# Patient Record
Sex: FEMALE | Race: WHITE
[De-identification: names, ages, dates, MRNs, and addresses within clinical notes are randomized per-mention and may not be internally consistent; named-entity substitution may affect disease eponyms.]

---

## 2017-07-04 ENCOUNTER — HOSPITAL ENCOUNTER (INPATIENT)
Dept: HOSPITAL 10 - PP2 | Age: 62
LOS: 3 days | Discharge: HOME HEALTH | DRG: 71 | End: 2017-07-07
Attending: HOSPITALIST | Admitting: HOSPITALIST
Payer: COMMERCIAL

## 2017-07-04 VITALS
WEIGHT: 159.84 LBS | HEIGHT: 60 IN | BODY MASS INDEX: 31.38 KG/M2 | WEIGHT: 159.84 LBS | HEIGHT: 60 IN | BODY MASS INDEX: 31.38 KG/M2

## 2017-07-04 VITALS — RESPIRATION RATE: 18 BRPM | DIASTOLIC BLOOD PRESSURE: 67 MMHG | SYSTOLIC BLOOD PRESSURE: 122 MMHG

## 2017-07-04 DIAGNOSIS — G93.40: Primary | ICD-10-CM

## 2017-07-04 DIAGNOSIS — F03.90: ICD-10-CM

## 2017-07-04 DIAGNOSIS — T76.11XA: ICD-10-CM

## 2017-07-04 DIAGNOSIS — E11.9: ICD-10-CM

## 2017-07-04 DIAGNOSIS — F32.9: ICD-10-CM

## 2017-07-04 DIAGNOSIS — I10: ICD-10-CM

## 2017-07-04 PROCEDURE — 86592 SYPHILIS TEST NON-TREP QUAL: CPT

## 2017-07-04 PROCEDURE — 80061 LIPID PANEL: CPT

## 2017-07-04 PROCEDURE — 83735 ASSAY OF MAGNESIUM: CPT

## 2017-07-04 PROCEDURE — 85025 COMPLETE CBC W/AUTO DIFF WBC: CPT

## 2017-07-04 PROCEDURE — 82550 ASSAY OF CK (CPK): CPT

## 2017-07-04 PROCEDURE — 84436 ASSAY OF TOTAL THYROXINE: CPT

## 2017-07-04 PROCEDURE — 84100 ASSAY OF PHOSPHORUS: CPT

## 2017-07-04 PROCEDURE — 80048 BASIC METABOLIC PNL TOTAL CA: CPT

## 2017-07-04 PROCEDURE — 84484 ASSAY OF TROPONIN QUANT: CPT

## 2017-07-04 PROCEDURE — 84443 ASSAY THYROID STIM HORMONE: CPT

## 2017-07-04 PROCEDURE — 82962 GLUCOSE BLOOD TEST: CPT

## 2017-07-04 PROCEDURE — 70460 CT HEAD/BRAIN W/DYE: CPT

## 2017-07-04 PROCEDURE — 82607 VITAMIN B-12: CPT

## 2017-07-04 PROCEDURE — 82553 CREATINE MB FRACTION: CPT

## 2017-07-04 PROCEDURE — 87086 URINE CULTURE/COLONY COUNT: CPT

## 2017-07-04 PROCEDURE — 83036 HEMOGLOBIN GLYCOSYLATED A1C: CPT

## 2017-07-04 PROCEDURE — 84479 ASSAY OF THYROID (T3 OR T4): CPT

## 2017-07-05 VITALS — RESPIRATION RATE: 18 BRPM | DIASTOLIC BLOOD PRESSURE: 79 MMHG | SYSTOLIC BLOOD PRESSURE: 168 MMHG

## 2017-07-05 VITALS — RESPIRATION RATE: 19 BRPM | SYSTOLIC BLOOD PRESSURE: 130 MMHG | DIASTOLIC BLOOD PRESSURE: 62 MMHG

## 2017-07-05 VITALS — SYSTOLIC BLOOD PRESSURE: 140 MMHG | DIASTOLIC BLOOD PRESSURE: 64 MMHG

## 2017-07-05 LAB
CHOLEST SERPL-MCNC: 252 MG/DL (ref 100–200)
CHOLEST/HDLC SERPL: 6.3 RATIO
CK MB SERPL-MCNC: 0.55 NG/ML (ref 0–2.4)
CK MB SERPL-MCNC: 0.67 NG/ML (ref 0–2.4)
CK SERPL-CCNC: 50 IU/L (ref 23–200)
CK SERPL-CCNC: 53 IU/L (ref 23–200)
HDLC SERPL-MCNC: 40 MG/DL (ref 35–98)
TRIGL SERPL-MCNC: 180 MG/DL (ref 0–149)
TROPONIN I SERPL-MCNC: < 0.012 NG/ML (ref 0–0.12)
TROPONIN I SERPL-MCNC: < 0.012 NG/ML (ref 0–0.12)
TSH SERPL-ACNC: 0.46 MIU/L (ref 0.47–4.68)

## 2017-07-05 RX ADMIN — FAMOTIDINE SCH MG: 20 TABLET ORAL at 08:30

## 2017-07-05 RX ADMIN — ESCITALOPRAM OXALATE SCH MG: 10 TABLET ORAL at 08:29

## 2017-07-05 RX ADMIN — CEFTRIAXONE SCH MLS/HR: 1 INJECTION, SOLUTION INTRAVENOUS at 01:38

## 2017-07-05 RX ADMIN — AMLODIPINE BESYLATE SCH MG: 10 TABLET ORAL at 08:30

## 2017-07-05 RX ADMIN — FAMOTIDINE SCH MG: 20 TABLET ORAL at 20:37

## 2017-07-05 RX ADMIN — ENOXAPARIN SODIUM SCH MG: 100 INJECTION SUBCUTANEOUS at 08:29
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
## 2017-07-06 VITALS — RESPIRATION RATE: 18 BRPM | DIASTOLIC BLOOD PRESSURE: 84 MMHG | SYSTOLIC BLOOD PRESSURE: 186 MMHG

## 2017-07-06 VITALS — DIASTOLIC BLOOD PRESSURE: 95 MMHG | SYSTOLIC BLOOD PRESSURE: 147 MMHG | RESPIRATION RATE: 18 BRPM

## 2017-07-06 VITALS — DIASTOLIC BLOOD PRESSURE: 80 MMHG | SYSTOLIC BLOOD PRESSURE: 170 MMHG | RESPIRATION RATE: 20 BRPM

## 2017-07-06 VITALS — SYSTOLIC BLOOD PRESSURE: 176 MMHG | DIASTOLIC BLOOD PRESSURE: 85 MMHG

## 2017-07-06 VITALS — SYSTOLIC BLOOD PRESSURE: 196 MMHG | DIASTOLIC BLOOD PRESSURE: 85 MMHG

## 2017-07-06 VITALS — SYSTOLIC BLOOD PRESSURE: 145 MMHG | HEART RATE: 76 BPM | DIASTOLIC BLOOD PRESSURE: 70 MMHG

## 2017-07-06 VITALS — SYSTOLIC BLOOD PRESSURE: 162 MMHG | DIASTOLIC BLOOD PRESSURE: 72 MMHG | RESPIRATION RATE: 18 BRPM

## 2017-07-06 VITALS — DIASTOLIC BLOOD PRESSURE: 65 MMHG | SYSTOLIC BLOOD PRESSURE: 139 MMHG | RESPIRATION RATE: 20 BRPM

## 2017-07-06 VITALS — DIASTOLIC BLOOD PRESSURE: 94 MMHG | SYSTOLIC BLOOD PRESSURE: 152 MMHG

## 2017-07-06 LAB
ADD SCAN DIFF: NO
ANION GAP SERPL CALC-SCNC: 15 MMOL/L (ref 8–16)
BASOPHILS # BLD AUTO: 0.1 10^3/UL (ref 0–0.1)
BASOPHILS NFR BLD: 0.7 % (ref 0–2)
BUN SERPL-MCNC: 9 MG/DL (ref 7–20)
CALCIUM SERPL-MCNC: 9.5 MG/DL (ref 8.4–10.2)
CHLORIDE SERPL-SCNC: 107 MMOL/L (ref 97–110)
CO2 SERPL-SCNC: 25 MMOL/L (ref 21–31)
CREAT SERPL-MCNC: 0.88 MG/DL (ref 0.44–1)
EOSINOPHIL # BLD: 0.2 10^3/UL (ref 0–0.5)
EOSINOPHIL NFR BLD: 2.5 % (ref 0–7)
ERYTHROCYTE [DISTWIDTH] IN BLOOD BY AUTOMATED COUNT: 12.7 % (ref 11.5–14.5)
GLUCOSE SERPL-MCNC: 108 MG/DL (ref 70–220)
HCT VFR BLD CALC: 37.2 % (ref 37–47)
HGB BLD-MCNC: 11.8 G/DL (ref 12–16)
LYMPHOCYTES # BLD AUTO: 2.9 10^3/UL (ref 0.8–2.9)
LYMPHOCYTES NFR BLD AUTO: 38.2 % (ref 15–51)
MAGNESIUM SERPL-MCNC: 1.9 MG/DL (ref 1.7–2.5)
MCH RBC QN AUTO: 26 PG (ref 29–33)
MCHC RBC AUTO-ENTMCNC: 31.7 G/DL (ref 32–37)
MCV RBC AUTO: 81.9 FL (ref 82–101)
MONOCYTES # BLD: 0.7 10^3/UL (ref 0.3–0.9)
MONOCYTES NFR BLD: 8.9 % (ref 0–11)
NEUTROPHILS # BLD: 3.8 10^3/UL (ref 1.6–7.5)
NEUTROPHILS NFR BLD AUTO: 49.4 % (ref 39–77)
NRBC # BLD MANUAL: 0 10^3/UL (ref 0–0)
NRBC BLD QL: 0 /100WBC (ref 0–0)
PHOSPHATE SERPL-MCNC: 4.3 MG/DL (ref 2.5–4.9)
PLATELET # BLD: 250 10^3/UL (ref 140–415)
PMV BLD AUTO: 10.8 FL (ref 7.4–10.4)
POTASSIUM SERPL-SCNC: 4.3 MMOL/L (ref 3.5–5.1)
RBC # BLD AUTO: 4.54 10^6/UL (ref 4.2–5.4)
SODIUM SERPL-SCNC: 143 MMOL/L (ref 135–144)
T3RU NFR SERPL: 36.8 % (ref 23.5–40.5)
WBC # BLD AUTO: 7.7 10^3/UL (ref 4.8–10.8)

## 2017-07-06 RX ADMIN — LORAZEPAM PRN MG: 2 INJECTION, SOLUTION INTRAMUSCULAR; INTRAVENOUS at 14:04

## 2017-07-06 RX ADMIN — LORAZEPAM PRN MG: 2 INJECTION, SOLUTION INTRAMUSCULAR; INTRAVENOUS at 05:12

## 2017-07-06 RX ADMIN — FAMOTIDINE SCH MG: 20 TABLET ORAL at 08:48

## 2017-07-06 RX ADMIN — HYDRALAZINE HYDROCHLORIDE PRN MG: 20 INJECTION INTRAMUSCULAR; INTRAVENOUS at 13:07

## 2017-07-06 RX ADMIN — CEFTRIAXONE SCH MLS/HR: 1 INJECTION, SOLUTION INTRAVENOUS at 02:36

## 2017-07-06 RX ADMIN — AMLODIPINE BESYLATE SCH MG: 10 TABLET ORAL at 08:48

## 2017-07-06 RX ADMIN — ENOXAPARIN SODIUM SCH MG: 100 INJECTION SUBCUTANEOUS at 08:49

## 2017-07-06 RX ADMIN — LORAZEPAM PRN MG: 2 INJECTION, SOLUTION INTRAMUSCULAR; INTRAVENOUS at 09:32

## 2017-07-06 RX ADMIN — LISINOPRIL SCH MG: 10 TABLET ORAL at 21:05

## 2017-07-06 RX ADMIN — FAMOTIDINE SCH MG: 20 TABLET ORAL at 21:04

## 2017-07-06 RX ADMIN — ESCITALOPRAM OXALATE SCH MG: 10 TABLET ORAL at 08:48
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
## 2017-07-07 VITALS — RESPIRATION RATE: 18 BRPM | HEART RATE: 80 BPM | SYSTOLIC BLOOD PRESSURE: 142 MMHG | DIASTOLIC BLOOD PRESSURE: 75 MMHG

## 2017-07-07 VITALS — DIASTOLIC BLOOD PRESSURE: 88 MMHG | SYSTOLIC BLOOD PRESSURE: 140 MMHG | HEART RATE: 66 BPM

## 2017-07-07 VITALS — RESPIRATION RATE: 18 BRPM | DIASTOLIC BLOOD PRESSURE: 79 MMHG | SYSTOLIC BLOOD PRESSURE: 152 MMHG

## 2017-07-07 VITALS — RESPIRATION RATE: 20 BRPM | DIASTOLIC BLOOD PRESSURE: 92 MMHG | SYSTOLIC BLOOD PRESSURE: 175 MMHG

## 2017-07-07 VITALS — DIASTOLIC BLOOD PRESSURE: 63 MMHG | SYSTOLIC BLOOD PRESSURE: 116 MMHG | RESPIRATION RATE: 18 BRPM

## 2017-07-07 LAB
ADD SCAN DIFF: NO
ANION GAP SERPL CALC-SCNC: 20 MMOL/L (ref 8–16)
BASOPHILS # BLD AUTO: 0 10^3/UL (ref 0–0.1)
BASOPHILS NFR BLD: 0.5 % (ref 0–2)
BUN SERPL-MCNC: 10 MG/DL (ref 7–20)
CALCIUM SERPL-MCNC: 9.7 MG/DL (ref 8.4–10.2)
CHLORIDE SERPL-SCNC: 104 MMOL/L (ref 97–110)
CO2 SERPL-SCNC: 23 MMOL/L (ref 21–31)
CREAT SERPL-MCNC: 0.85 MG/DL (ref 0.44–1)
EOSINOPHIL # BLD: 0.2 10^3/UL (ref 0–0.5)
EOSINOPHIL NFR BLD: 2.2 % (ref 0–7)
ERYTHROCYTE [DISTWIDTH] IN BLOOD BY AUTOMATED COUNT: 13 % (ref 11.5–14.5)
GLUCOSE SERPL-MCNC: 110 MG/DL (ref 70–220)
HCT VFR BLD CALC: 37.9 % (ref 37–47)
HGB BLD-MCNC: 12.5 G/DL (ref 12–16)
LYMPHOCYTES # BLD AUTO: 1.9 10^3/UL (ref 0.8–2.9)
LYMPHOCYTES NFR BLD AUTO: 22.5 % (ref 15–51)
MCH RBC QN AUTO: 26.5 PG (ref 29–33)
MCHC RBC AUTO-ENTMCNC: 33 G/DL (ref 32–37)
MCV RBC AUTO: 80.5 FL (ref 82–101)
MONOCYTES # BLD: 0.7 10^3/UL (ref 0.3–0.9)
MONOCYTES NFR BLD: 8.1 % (ref 0–11)
NEUTROPHILS # BLD: 5.7 10^3/UL (ref 1.6–7.5)
NEUTROPHILS NFR BLD AUTO: 66.5 % (ref 39–77)
NRBC # BLD MANUAL: 0 10^3/UL (ref 0–0)
NRBC BLD QL: 0 /100WBC (ref 0–0)
PLATELET # BLD: 265 10^3/UL (ref 140–415)
PMV BLD AUTO: 10.8 FL (ref 7.4–10.4)
POTASSIUM SERPL-SCNC: 4 MMOL/L (ref 3.5–5.1)
RBC # BLD AUTO: 4.71 10^6/UL (ref 4.2–5.4)
SODIUM SERPL-SCNC: 143 MMOL/L (ref 135–144)
WBC # BLD AUTO: 8.5 10^3/UL (ref 4.8–10.8)

## 2017-07-07 RX ADMIN — ESCITALOPRAM OXALATE SCH MG: 10 TABLET ORAL at 09:30

## 2017-07-07 RX ADMIN — CEFTRIAXONE SCH MLS/HR: 1 INJECTION, SOLUTION INTRAVENOUS at 02:08

## 2017-07-07 RX ADMIN — LISINOPRIL SCH MG: 10 TABLET ORAL at 09:30

## 2017-07-07 RX ADMIN — HYDRALAZINE HYDROCHLORIDE PRN MG: 20 INJECTION INTRAMUSCULAR; INTRAVENOUS at 15:27

## 2017-07-07 RX ADMIN — AMLODIPINE BESYLATE SCH MG: 10 TABLET ORAL at 09:31

## 2017-07-07 RX ADMIN — ENOXAPARIN SODIUM SCH MG: 100 INJECTION SUBCUTANEOUS at 09:32

## 2017-07-07 RX ADMIN — FAMOTIDINE SCH MG: 20 TABLET ORAL at 09:30
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
## 2017-07-07 NOTE — RADRPT
PROCEDURE:   CT brain with contrast 

 

CLINICAL INDICATION:   Altered mental status, neurologic deficit

 

TECHNIQUE:   CT scan of the Brain with contrast was performed with a 64-slice multi detector scanner
.  Contiguous 5 mm slice thickness transaxial images were acquired from the high vertex to the florentino
en magnum.  The images were reviewed on a PACS workstation. The patient was examined following the u
ncomplicated intravenous administration of 75cc of Isovue 300. DOSE: CTDI = 53 mGy and the DLP = 870
 mGy-cm. One or more of the following dose reduction techniques were used: Automated exposure contro
l, Adjustment of the mA and/or kV according to patient size, and/or use of iterative reconstruction 
technique.

 

COMPARISON:   No prior studies are available for comparison. 

 

FINDINGS:

 

No acute parenchymal or subdural hemorrhage, significant mass effect or midline shift. Patchy hypoat
tenuation of the cerebral white matter is compatible with chronic microvascular ischemic changes. Va
scular calcifications. Prominence of the cortical sulci and ventricles are related to moderate-sever
e cerebral volume loss.  No significant opacification of the visualized paranasal sinuses or mastoid
s. 

 

Heavy atherosclerotic calcification identified at the vertebral arteries with moderate to high-grade
 stenosis at the right proximal vertebral artery.  The right vertebral artery is hypoplastic when co
mpared to the left.  No proximal large vessel occlusion of the left vertebral artery, basilar artery
 or bilateral PCA. Multifocal luminal narrowing of the bilateral cavernous and clinoid ICA.  No prox
imal large vessel occlusion of the bilateral MCA or TABATHA.

 

IMPRESSION:

 

No definite acute intracranial findings.

Chronic microvascular disease and intracranial atherosclerosis.

Moderate-severe cerebral volume loss.

No anterior circulation proximal large vessel occlusion identified.

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

 

RPTAT: AA

_____________________________________________ 

.Eliezer De La Torre MD MD           Date    Time 

Electronically viewed and signed by .Eliezre De La Torre MD, MD on 07/07/2017 09:14 

 

D:  07/07/2017 09:14  T:  07/07/2017 09:14

.T/

## 2017-07-07 NOTE — PDOCDIS
Discharge Instructions


CONDITION


Patient Condition:  Good





HOME CARE INSTRUCTIONS:


Special Diet:  1800 TOBY, PUREED





ACTIVITY:








Activity Restrictions:   No Restrictions











FOLLOW UP/APPOINTMENTS


Follow-up Plan


F/U WITH YOUR PCP IN 1-2 WEEKS











KARI ALBARADO Jul 7, 2017 11:45

## 2017-11-26 ENCOUNTER — HOSPITAL ENCOUNTER (EMERGENCY)
Dept: HOSPITAL 10 - E/R | Age: 62
Discharge: HOME | End: 2017-11-26
Payer: COMMERCIAL

## 2017-11-26 VITALS
HEIGHT: 64 IN | HEIGHT: 64 IN | BODY MASS INDEX: 24.81 KG/M2 | WEIGHT: 145.31 LBS | WEIGHT: 145.31 LBS | BODY MASS INDEX: 24.81 KG/M2

## 2017-11-26 VITALS
SYSTOLIC BLOOD PRESSURE: 182 MMHG | DIASTOLIC BLOOD PRESSURE: 86 MMHG | TEMPERATURE: 98.8 F | HEART RATE: 95 BPM | RESPIRATION RATE: 16 BRPM

## 2017-11-26 DIAGNOSIS — Z79.82: ICD-10-CM

## 2017-11-26 DIAGNOSIS — I25.10: ICD-10-CM

## 2017-11-26 DIAGNOSIS — F17.210: ICD-10-CM

## 2017-11-26 DIAGNOSIS — I10: ICD-10-CM

## 2017-11-26 DIAGNOSIS — E11.9: ICD-10-CM

## 2017-11-26 DIAGNOSIS — Z79.84: ICD-10-CM

## 2017-11-26 DIAGNOSIS — G30.8: ICD-10-CM

## 2017-11-26 DIAGNOSIS — N39.0: Primary | ICD-10-CM

## 2017-11-26 LAB
ADD UMIC: YES
ALBUMIN SERPL-MCNC: 3.7 G/DL (ref 3.3–4.9)
ALBUMIN/GLOB SERPL: 0.88 {RATIO}
ALP SERPL-CCNC: 145 IU/L (ref 42–121)
ALT SERPL-CCNC: 25 IU/L (ref 13–69)
ANION GAP SERPL CALC-SCNC: 19 MMOL/L (ref 8–16)
AST SERPL-CCNC: 23 IU/L (ref 15–46)
BACTERIA #/AREA URNS HPF: (no result) /HPF
BASOPHILS # BLD AUTO: 0.1 10^3/UL (ref 0–0.1)
BASOPHILS NFR BLD: 0.4 % (ref 0–2)
BILIRUB DIRECT SERPL-MCNC: 0 MG/DL (ref 0–0.2)
BILIRUB SERPL-MCNC: 0.5 MG/DL (ref 0.2–1.3)
BUN SERPL-MCNC: 23 MG/DL (ref 7–20)
CALCIUM SERPL-MCNC: 9.1 MG/DL (ref 8.4–10.2)
CHLORIDE SERPL-SCNC: 99 MMOL/L (ref 97–110)
CO2 SERPL-SCNC: 26 MMOL/L (ref 21–31)
COLOR UR: (no result)
CREAT SERPL-MCNC: 0.94 MG/DL (ref 0.44–1)
EOSINOPHIL # BLD: 0.2 10^3/UL (ref 0–0.5)
EOSINOPHIL NFR BLD: 2.1 % (ref 0–7)
ERYTHROCYTE [DISTWIDTH] IN BLOOD BY AUTOMATED COUNT: 13.6 % (ref 11.5–14.5)
GLOBULIN SER-MCNC: 4.2 G/DL (ref 1.3–3.2)
GLUCOSE SERPL-MCNC: 154 MG/DL (ref 70–220)
GLUCOSE UR STRIP-MCNC: NEGATIVE MG/DL
HCT VFR BLD CALC: 34.5 % (ref 37–47)
HGB BLD-MCNC: 10.8 G/DL (ref 12–16)
KETONES UR STRIP.AUTO-MCNC: NEGATIVE MG/DL
LYMPHOCYTES # BLD AUTO: 2.7 10^3/UL (ref 0.8–2.9)
LYMPHOCYTES NFR BLD AUTO: 23.2 % (ref 15–51)
MCH RBC QN AUTO: 24.3 PG (ref 29–33)
MCHC RBC AUTO-ENTMCNC: 31.3 G/DL (ref 32–37)
MCV RBC AUTO: 77.7 FL (ref 82–101)
MONOCYTES # BLD: 0.7 10^3/UL (ref 0.3–0.9)
MONOCYTES NFR BLD: 5.7 % (ref 0–11)
MUCOUS THREADS #/AREA URNS HPF: (no result) /HPF
NEUTROPHILS # BLD: 7.8 10^3/UL (ref 1.6–7.5)
NEUTROPHILS NFR BLD AUTO: 68.2 % (ref 39–77)
NITRITE UR QL STRIP.AUTO: NEGATIVE MG/DL
NRBC # BLD MANUAL: 0 10^3/UL (ref 0–0)
NRBC BLD AUTO-RTO: 0 /100WBC (ref 0–0)
PLATELET # BLD: 340 10^3/UL (ref 140–415)
PMV BLD AUTO: 11.2 FL (ref 7.4–10.4)
POTASSIUM SERPL-SCNC: 3.5 MMOL/L (ref 3.5–5.1)
PROT SERPL-MCNC: 7.9 G/DL (ref 6.1–8.1)
RBC # BLD AUTO: 4.44 10^6/UL (ref 4.2–5.4)
RBC # UR AUTO: (no result) MG/DL
SODIUM SERPL-SCNC: 140 MMOL/L (ref 135–144)
SQUAMOUS #/AREA URNS HPF: (no result) /HPF
TROPONIN I SERPL-MCNC: < 0.012 NG/ML (ref 0–0.12)
UR ASCORBIC ACID: NEGATIVE MG/DL
UR BILIRUBIN (DIP): NEGATIVE MG/DL
UR CLARITY: (no result)
UR PH (DIP): 5 (ref 5–9)
UR RBC: 45 /HPF (ref 0–5)
UR SPECIFIC GRAVITY (DIP): 1.02 (ref 1–1.03)
UR TOTAL PROTEIN (DIP): (no result) MG/DL
UROBILINOGEN UR STRIP-ACNC: (no result) MG/DL
WBC # BLD AUTO: 11.4 10^3/UL (ref 4.8–10.8)
WBC # UR STRIP: (no result) LEU/UL

## 2017-11-26 PROCEDURE — 84484 ASSAY OF TROPONIN QUANT: CPT

## 2017-11-26 PROCEDURE — 96374 THER/PROPH/DIAG INJ IV PUSH: CPT

## 2017-11-26 PROCEDURE — 87086 URINE CULTURE/COLONY COUNT: CPT

## 2017-11-26 PROCEDURE — 80053 COMPREHEN METABOLIC PANEL: CPT

## 2017-11-26 PROCEDURE — 81001 URINALYSIS AUTO W/SCOPE: CPT

## 2017-11-26 PROCEDURE — 96375 TX/PRO/DX INJ NEW DRUG ADDON: CPT

## 2017-11-26 PROCEDURE — 36415 COLL VENOUS BLD VENIPUNCTURE: CPT

## 2017-11-26 PROCEDURE — P9612 CATHETERIZE FOR URINE SPEC: HCPCS

## 2017-11-26 PROCEDURE — 85025 COMPLETE CBC W/AUTO DIFF WBC: CPT

## 2017-11-26 NOTE — ERD
ER Documentation


Chief Complaint


Chief Complaint


generalize pain & weakness, poor appetite x2 days per sons





HPI


Patient is a 62-year-old female with severe dementia who presents with 

increased agitation and moaning for the last 2 days.  The patient is nonverbal 

at baseline.  She is Romansh speaking only.  Her son thinks that she may be 

experiencing pain.  He denies any trauma or injury.  He denies fever or 

vomiting.  She has not been constipated.  History is limited due to the patient 

being nonverbal.  He states that she is not been able to take her medication 

and has had poor appetite for the last 2 days as well.





ROS


All systems reviewed and are negative except as per history of present illness.





Medications


Home Meds


Active Scripts


Cephalexin* (Cephalexin* Susp) 250 Mg/5 Ml Susp.recon, 10 ML PO Q6 for 7 Days, #

1 BOTTLE


   Prov:RIKI LONGO MD         11/26/17


Atorvastatin Calcium* (Atorvastatin Calcium*) 20 Mg Tablet, 20 MG PO QHS, #60 

TAB


   Prov:KARI ALBARADO         7/7/17


Aspirin* (Aspirin* EC) 81 Mg Tablet.dr, 81 MG PO DAILY, #90 TAB


   Prov:KARI ALBARADO         7/7/17


Lisinopril* (Lisinopril*) 10 Mg Tablet, 10 MG PO DAILY, #60 TAB


   Prov:KARI ALBARADO         7/7/17


Reported Medications


Amlodipine Besylate* (Amlodipine Besylate*) 10 Mg Tablet, 10 MG PO DAILY, #30 

TAB


   7/4/17


Clonidine Hcl* (Clonidine Hcl*) 0.2 Mg Tablet, 0.2 MG PO DAILY, TAB


   7/4/17


Escitalopram Oxalate* (Escitalopram Oxalate*) 10 Mg Tablet, 10 MG PO DAILY, #30 

TAB


   7/4/17


Metoprolol Succinate* (Toprol XL*) 100 Mg Tab.sr.24h, 100 MG PO DAILY, #30 TAB


   7/4/17


Metformin Hcl* (Metformin Hcl*) 1,000 Mg Tablet, 1000 MG PO WITH BREAKFAST DINNE

, #30 TAB


   7/4/17


Meclizine Hcl* (Meclizine Hcl*) 25 Mg Tablet, 25 MG PO TID, TAB


   7/4/17





Allergies


Allergies:  


Coded Allergies:  


     ondansetron (Verified  Allergy, Mild, Itching and redness on face, 7/4/17)





PMhx/Soc


Past medical history: CVA with left sided hemiparesis, coronary artery disease, 

severe dementia, hypertension, diabetes mellitus


Past surgical history: Back surgery


Social history: Lives with children, no tobacco or alcohol


History of Surgery:  Yes (per pt back sx)


Hx Neurological Disorder:  No (stroke 2016-per ED nurse )


Hx Respiratory Disorders:  No


Hx Cardiac Disorders:  Yes (HTN)


Hx Miscellaneous Medical Probl:  Yes (Chronic depression. Dementia.)


Hx Alcohol Use:  No


Hx Substance Use:  No


Hx Tobacco Use:  Yes


Smoking Status:  Current every day smoker





FmHx


Noncontributory





Physical Exam


Vitals





Vital Signs








  Date Time  Temp Pulse Resp B/P Pulse Ox O2 Delivery O2 Flow Rate FiO2


 


11/26/17 17:59 98.8 95 16 182/86 97 Room Air  


 


11/26/17 14:49 99.1 129 18 184/97 97   








Physical Exam


Const:     Alert, nonverbal, appears calm with intermittent loud screaming.


Head:   Atraumatic 


Eyes:    Normal Conjunctiva, No pallor, no icterus


ENT:    Normal External Ears, Nose and Mouth.  Tacky mucous membranes


Neck:               Full range of motion.


Respiratory:    Lungs clear to auscultation, no wheezes, no rales


Cardio:    Regular rate and rhythm, no murmurs


Abd:    Soft, non tender, non distended.


Skin:    No petechiae, Follicular rash without signs of cellulitis on left 

chest wall and axilla, no vesicular or dermatomal rash


Back:    No midline or flank tenderness


Ext:    No cyanosis, or edema


Neur:    Awake and alert, Flexion contracture of left arm


Psych:    Agitated, assessment limited by severe dementia


Result Diagram:  


11/26/17 1559                                                                  

              11/26/17 1559





Results 24 hrs





 Laboratory Tests








Test


  11/26/17


15:59 11/26/17


16:00


 


White Blood Count 11.410^3/ul  


 


Red Blood Count 4.4410^6/ul  


 


Hemoglobin 10.8g/dl  


 


Hematocrit 34.5%  


 


Mean Corpuscular Volume 77.7fl  


 


Mean Corpuscular Hemoglobin 24.3pg  


 


Mean Corpuscular Hemoglobin


Concent 31.3g/dl 


  


 


 


Red Cell Distribution Width 13.6%  


 


Platelet Count 10134^3/UL  


 


Mean Platelet Volume 11.2fl  


 


Neutrophils % 68.2%  


 


Lymphocytes % 23.2%  


 


Monocytes % 5.7%  


 


Eosinophils % 2.1%  


 


Basophils % 0.4%  


 


Nucleated Red Blood Cells % 0.0/100WBC  


 


Neutrophils # 7.810^3/ul  


 


Lymphocytes # 2.710^3/ul  


 


Monocytes # 0.710^3/ul  


 


Eosinophils # 0.210^3/ul  


 


Basophils # 0.110^3/ul  


 


Nucleated Red Blood Cells # 0.010^3/ul  


 


Sodium Level 140mmol/L  


 


Potassium Level 3.5mmol/L  


 


Chloride Level 99mmol/L  


 


Carbon Dioxide Level 26mmol/L  


 


Anion Gap 19  


 


Blood Urea Nitrogen 23mg/dl  


 


Creatinine 0.94mg/dl  


 


Glucose Level 154mg/dl  


 


Calcium Level 9.1mg/dl  


 


Total Bilirubin 0.5mg/dl  


 


Direct Bilirubin 0.00mg/dl  


 


Indirect Bilirubin 0.5mg/dl  


 


Aspartate Amino Transf


(AST/SGOT) 23IU/L 


  


 


 


Alanine Aminotransferase


(ALT/SGPT) 25IU/L 


  


 


 


Alkaline Phosphatase 145IU/L  


 


Troponin I < 0.012ng/ml  


 


Total Protein 7.9g/dl  


 


Albumin 3.7g/dl  


 


Globulin 4.20g/dl  


 


Albumin/Globulin Ratio 0.88  


 


Urine Color  MIGUEL 


 


Urine Clarity  TURBID 


 


Urine pH  5.0 


 


Urine Specific Gravity  1.024 


 


Urine Ketones  NEGATIVEmg/dL 


 


Urine Nitrite  NEGATIVEmg/dL 


 


Urine Bilirubin  NEGATIVEmg/dL 


 


Urine Urobilinogen  2+mg/dL 


 


Urine Leukocyte Esterase  2+Dipesh/ul 


 


Urine Microscopic RBC  45/HPF 


 


Urine Microscopic WBC  50/HPF 


 


Urine Squamous Epithelial


Cells 


  FEW/HPF 


 


 


Urine Bacteria  MANY/HPF 


 


Urine Mucus  MANY/HPF 


 


Urine Hemoglobin  2+mg/dL 


 


Urine Glucose  NEGATIVEmg/dL 


 


Urine Total Protein  1+mg/dl 








 Current Medications








 Medications


  (Trade)  Dose


 Ordered  Sig/Glen


 Route


 PRN Reason  Start Time


 Stop Time Status Last Admin


Dose Admin


 


 Haloperidol 5 mg  5 mg  STK-MED ONCE


 .ROUTE


   11/26/17 15:44


 11/26/17 15:45 DC  


 


 


 Sodium Chloride


  (NS)  500 ml @ 


 500 mls/hr  Q1H STAT


 IV


   11/26/17 15:53


 11/26/17 16:52 DC 11/26/17 16:24


 


 


 Haloperidol 2.5 mg  2.5 mg  ONCE  ONCE


 IV


   11/26/17 16:00


 11/26/17 16:01 DC 11/26/17 16:24


 


 


 Ceftriaxone Sodium


  (Rocephin)  50 ml @ 


 100 mls/hr  ONCE  ONCE


 IVPB


   11/26/17 17:00


 11/26/17 17:29 DC 11/26/17 17:12


 











Procedures/MDM


MDM: Patient is a 62-year-old female with history of prior stroke and Alzheimer'

s dementia.  She presents to the ER with increased agitation and moaning over 

the last 2 days.  She is also had poor appetite and oral intake.  Her workup 

reveals evidence of urinary tract infection.  She does not have signs of 

sepsis.  She does have signs of mild dehydration.  Urine culture sent, and the 

patient was given IV fluids and a dose of ceftriaxone.  Her son states that she 

is able to tolerate liquid medications, but has had trouble taking her pill 

medications.  She has not seen a primary doctor in over a year.  Her son states 

that she has a primary doctor.  I advised her sons to follow-up with her PMD 

within the next 2 days in case she does not respond adequately to antibiotics, 

and to discuss alternative formulations for her medications.  She may also 

require additional medications for behavioral control for her Alzheimer's 

disease.  She has severe dementia at baseline.  There is no evidence of an 

acute neurological condition or sepsis.





Departure


Diagnosis:  


 Primary Impression:  


 Urinary tract infection


 Urinary tract infection type:  site unspecified  Hematuria presence:  with 

hematuria  Qualified Code:  N39.0 - Urinary tract infection with hematuria, 

site unspecified


 Additional Impressions:  


 Agitation


 Alzheimer's dementia with behavioral disturbance


 Alzheimer's disease onset:  unspecified onset  Qualified Code:  G30.8 - 

Alzheimer's dementia with behavioral disturbance, unspecified timing of 

dementia onset


Condition:  RIKI Garcia MD Nov 26, 2017 16:04

## 2018-08-17 ENCOUNTER — HOSPITAL ENCOUNTER (INPATIENT)
Dept: HOSPITAL 91 - PP2 | Age: 63
LOS: 13 days | Discharge: NURSING, REHAB OR LONG-TERM CARE | DRG: 871 | End: 2018-08-30
Payer: COMMERCIAL

## 2018-08-17 ENCOUNTER — HOSPITAL ENCOUNTER (INPATIENT)
Age: 63
LOS: 13 days | Discharge: NURSING, REHAB OR LONG-TERM CARE | DRG: 871 | End: 2018-08-30

## 2018-08-17 DIAGNOSIS — Z93.1: ICD-10-CM

## 2018-08-17 DIAGNOSIS — M86.8X7: ICD-10-CM

## 2018-08-17 DIAGNOSIS — L89.154: ICD-10-CM

## 2018-08-17 DIAGNOSIS — Z74.01: ICD-10-CM

## 2018-08-17 DIAGNOSIS — Z95.1: ICD-10-CM

## 2018-08-17 DIAGNOSIS — I96: ICD-10-CM

## 2018-08-17 DIAGNOSIS — I69.369: ICD-10-CM

## 2018-08-17 DIAGNOSIS — I70.201: ICD-10-CM

## 2018-08-17 DIAGNOSIS — L89.613: ICD-10-CM

## 2018-08-17 DIAGNOSIS — N39.0: ICD-10-CM

## 2018-08-17 DIAGNOSIS — I69.398: ICD-10-CM

## 2018-08-17 DIAGNOSIS — I10: ICD-10-CM

## 2018-08-17 DIAGNOSIS — F03.90: ICD-10-CM

## 2018-08-17 DIAGNOSIS — B96.5: ICD-10-CM

## 2018-08-17 DIAGNOSIS — B95.2: ICD-10-CM

## 2018-08-17 DIAGNOSIS — B96.4: ICD-10-CM

## 2018-08-17 DIAGNOSIS — M85.80: ICD-10-CM

## 2018-08-17 DIAGNOSIS — Z66: ICD-10-CM

## 2018-08-17 DIAGNOSIS — B95.61: ICD-10-CM

## 2018-08-17 DIAGNOSIS — E11.621: ICD-10-CM

## 2018-08-17 DIAGNOSIS — M24.50: ICD-10-CM

## 2018-08-17 DIAGNOSIS — Z51.5: ICD-10-CM

## 2018-08-17 DIAGNOSIS — L89.892: ICD-10-CM

## 2018-08-17 DIAGNOSIS — G82.50: ICD-10-CM

## 2018-08-17 DIAGNOSIS — A41.9: Primary | ICD-10-CM

## 2018-08-17 DIAGNOSIS — E87.6: ICD-10-CM

## 2018-08-17 PROCEDURE — 81001 URINALYSIS AUTO W/SCOPE: CPT

## 2018-08-17 PROCEDURE — 74177 CT ABD & PELVIS W/CONTRAST: CPT

## 2018-08-17 PROCEDURE — 93005 ELECTROCARDIOGRAM TRACING: CPT

## 2018-08-17 PROCEDURE — 87070 CULTURE OTHR SPECIMN AEROBIC: CPT

## 2018-08-17 PROCEDURE — 71045 X-RAY EXAM CHEST 1 VIEW: CPT

## 2018-08-17 PROCEDURE — 85651 RBC SED RATE NONAUTOMATED: CPT

## 2018-08-17 PROCEDURE — 87040 BLOOD CULTURE FOR BACTERIA: CPT

## 2018-08-17 PROCEDURE — 80053 COMPREHEN METABOLIC PANEL: CPT

## 2018-08-17 PROCEDURE — 82962 GLUCOSE BLOOD TEST: CPT

## 2018-08-17 PROCEDURE — 84520 ASSAY OF UREA NITROGEN: CPT

## 2018-08-17 PROCEDURE — 87081 CULTURE SCREEN ONLY: CPT

## 2018-08-17 PROCEDURE — 80202 ASSAY OF VANCOMYCIN: CPT

## 2018-08-17 PROCEDURE — 87086 URINE CULTURE/COLONY COUNT: CPT

## 2018-08-17 PROCEDURE — 83605 ASSAY OF LACTIC ACID: CPT

## 2018-08-17 PROCEDURE — 80048 BASIC METABOLIC PNL TOTAL CA: CPT

## 2018-08-17 PROCEDURE — 85025 COMPLETE CBC W/AUTO DIFF WBC: CPT

## 2018-08-17 PROCEDURE — 82565 ASSAY OF CREATININE: CPT

## 2018-08-17 PROCEDURE — 83036 HEMOGLOBIN GLYCOSYLATED A1C: CPT

## 2018-08-17 RX ADMIN — ASCORBIC ACID, VITAMIN A PALMITATE, CHOLECALCIFEROL, THIAMINE HYDROCHLORIDE, RIBOFLAVIN-5 PHOSPHATE SODIUM, PYRIDOXINE HYDROCHLORIDE, NIACINAMIDE, DEXPANTHENOL, ALPHA-TOCOPHEROL ACETATE, VITAMIN K1, FOLIC ACID, BIOTIN, CYANOCOBALAMIN 1 MLS/HR: 200; 3300; 200; 6; 3.6; 6; 40; 15; 10; 150; 600; 60; 5 INJECTION, SOLUTION INTRAVENOUS at 23:58

## 2018-08-18 LAB
ADD MAN DIFF?: NO
ADD UMIC: YES
ALANINE AMINOTRANSFERASE: 15 IU/L (ref 13–69)
ALBUMIN/GLOBULIN RATIO: 0.7
ALBUMIN: 3.1 G/DL (ref 3.3–4.9)
ALKALINE PHOSPHATASE: 158 IU/L (ref 42–121)
ANION GAP: 13 (ref 8–16)
ASPARTATE AMINO TRANSFERASE: 23 IU/L (ref 15–46)
BASOPHIL #: 0 10^3/UL (ref 0–0.1)
BASOPHILS %: 0.3 % (ref 0–2)
BILIRUBIN,DIRECT: 0 MG/DL (ref 0–0.2)
BILIRUBIN,TOTAL: 0.3 MG/DL (ref 0.2–1.3)
BLOOD UREA NITROGEN: 15 MG/DL (ref 7–20)
CALCIUM: 8.6 MG/DL (ref 8.4–10.2)
CARBON DIOXIDE: 25 MMOL/L (ref 21–31)
CHLORIDE: 106 MMOL/L (ref 97–110)
CREATININE: 0.55 MG/DL (ref 0.44–1)
EOSINOPHILS #: 0.1 10^3/UL (ref 0–0.5)
EOSINOPHILS %: 0.9 % (ref 0–7)
GLOBULIN: 4.4 G/DL (ref 1.3–3.2)
GLUCOSE: 128 MG/DL (ref 70–220)
HEMATOCRIT: 32.4 % (ref 37–47)
HEMOGLOBIN A1C: 5.9 % (ref 0–5.9)
HEMOGLOBIN: 10.2 G/DL (ref 12–16)
IMMATURE GRANS #M: 0.06 10^3/UL (ref 0–0.03)
IMMATURE GRANS % (M): 0.6 % (ref 0–0.43)
LYMPHOCYTES #: 2 10^3/UL (ref 0.8–2.9)
LYMPHOCYTES %: 20.4 % (ref 15–51)
MEAN CORPUSCULAR HEMOGLOBIN: 27.6 PG (ref 29–33)
MEAN CORPUSCULAR HGB CONC: 31.5 G/DL (ref 32–37)
MEAN CORPUSCULAR VOLUME: 87.6 FL (ref 82–101)
MEAN PLATELET VOLUME: 13.2 FL (ref 7.4–10.4)
MONOCYTE #: 0.8 10^3/UL (ref 0.3–0.9)
MONOCYTES %: 8.4 % (ref 0–11)
NEUTROPHIL #: 6.9 10^3/UL (ref 1.6–7.5)
NEUTROPHILS %: 69.4 % (ref 39–77)
NUCLEATED RED BLOOD CELLS #: 0 10^3/UL (ref 0–0)
NUCLEATED RED BLOOD CELLS%: 0 /100WBC (ref 0–0)
PLATELET COUNT: 270 10^3/UL (ref 140–415)
POSITIVE DIFF: (no result)
POTASSIUM: 3.5 MMOL/L (ref 3.5–5.1)
RED BLOOD COUNT: 3.7 10^6/UL (ref 4.2–5.4)
RED CELL DISTRIBUTION WIDTH: 13.2 % (ref 11.5–14.5)
SODIUM: 140 MMOL/L (ref 135–144)
TOTAL PROTEIN: 7.5 G/DL (ref 6.1–8.1)
UR ASCORBIC ACID: 40 MG/DL
UR BILIRUBIN (DIP): NEGATIVE MG/DL
UR BLOOD (DIP): (no result) MG/DL
UR CLARITY: (no result)
UR COLOR: YELLOW
UR GLUCOSE (DIP): (no result) MG/DL
UR KETONES (DIP): (no result) MG/DL
UR LEUKOCYTE ESTERASE (DIP): NEGATIVE LEU/UL
UR MUCUS: (no result) /HPF
UR NITRITE (DIP): NEGATIVE MG/DL
UR NONSQUAMOUS EPITHELIAL CELL: 1 /HPF
UR PH (DIP): 5 (ref 5–9)
UR RBC: > 182 /HPF (ref 0–5)
UR SPECIFIC GRAVITY (DIP): 1.02 (ref 1–1.03)
UR TOTAL PROTEIN (DIP): (no result) MG/DL
UR UROBILINOGEN (DIP): NEGATIVE MG/DL
UR WBC: 31 /HPF (ref 0–5)
WHITE BLOOD COUNT: 9.9 10^3/UL (ref 4.8–10.8)

## 2018-08-18 RX ADMIN — HEPARIN SODIUM 1 UNIT: 5000 INJECTION, SOLUTION INTRAVENOUS; SUBCUTANEOUS at 00:41

## 2018-08-18 RX ADMIN — INSULIN ASPART 1 UNIT: 100 INJECTION, SOLUTION INTRAVENOUS; SUBCUTANEOUS at 12:56

## 2018-08-18 RX ADMIN — PIPERACILLIN SODIUM AND TAZOBACTAM SODIUM 1 MLS/HR: 3; .375 INJECTION, POWDER, LYOPHILIZED, FOR SOLUTION INTRAVENOUS at 18:16

## 2018-08-18 RX ADMIN — PIPERACILLIN SODIUM AND TAZOBACTAM SODIUM 1 MLS/HR: 3; .375 INJECTION, POWDER, LYOPHILIZED, FOR SOLUTION INTRAVENOUS at 12:55

## 2018-08-18 RX ADMIN — ASCORBIC ACID, VITAMIN A PALMITATE, CHOLECALCIFEROL, THIAMINE HYDROCHLORIDE, RIBOFLAVIN-5 PHOSPHATE SODIUM, PYRIDOXINE HYDROCHLORIDE, NIACINAMIDE, DEXPANTHENOL, ALPHA-TOCOPHEROL ACETATE, VITAMIN K1, FOLIC ACID, BIOTIN, CYANOCOBALAMIN 1 MLS/HR: 200; 3300; 200; 6; 3.6; 6; 40; 15; 10; 150; 600; 60; 5 INJECTION, SOLUTION INTRAVENOUS at 21:53

## 2018-08-18 RX ADMIN — VANCOMYCIN HYDROCHLORIDE 1 MLS/HR: 1 INJECTION, POWDER, LYOPHILIZED, FOR SOLUTION INTRAVENOUS at 02:21

## 2018-08-18 RX ADMIN — HYDRALAZINE HYDROCHLORIDE 1 MG: 20 INJECTION INTRAMUSCULAR; INTRAVENOUS at 02:38

## 2018-08-18 RX ADMIN — COLLAGENASE SANTYL 1 APPLIC: 250 OINTMENT TOPICAL at 22:38

## 2018-08-18 RX ADMIN — ASCORBIC ACID, VITAMIN A PALMITATE, CHOLECALCIFEROL, THIAMINE HYDROCHLORIDE, RIBOFLAVIN-5 PHOSPHATE SODIUM, PYRIDOXINE HYDROCHLORIDE, NIACINAMIDE, DEXPANTHENOL, ALPHA-TOCOPHEROL ACETATE, VITAMIN K1, FOLIC ACID, BIOTIN, CYANOCOBALAMIN 1 MLS/HR: 200; 3300; 200; 6; 3.6; 6; 40; 15; 10; 150; 600; 60; 5 INJECTION, SOLUTION INTRAVENOUS at 12:50

## 2018-08-18 RX ADMIN — INSULIN ASPART 1 UNIT: 100 INJECTION, SOLUTION INTRAVENOUS; SUBCUTANEOUS at 00:00

## 2018-08-18 RX ADMIN — ATORVASTATIN CALCIUM 1 MG: 20 TABLET, FILM COATED ORAL at 21:23

## 2018-08-18 RX ADMIN — HEPARIN SODIUM 1 UNIT: 5000 INJECTION, SOLUTION INTRAVENOUS; SUBCUTANEOUS at 21:24

## 2018-08-18 RX ADMIN — MECLIZINE HYDROCHLORIDE 1 MG: 25 TABLET ORAL at 12:57

## 2018-08-18 RX ADMIN — HEPARIN SODIUM 1 UNIT: 5000 INJECTION, SOLUTION INTRAVENOUS; SUBCUTANEOUS at 12:57

## 2018-08-18 RX ADMIN — MECLIZINE HYDROCHLORIDE 1 MG: 25 TABLET ORAL at 21:23

## 2018-08-18 RX ADMIN — MECLIZINE HYDROCHLORIDE 1 MG: 25 TABLET ORAL at 09:00

## 2018-08-18 RX ADMIN — INSULIN ASPART 1 UNIT: 100 INJECTION, SOLUTION INTRAVENOUS; SUBCUTANEOUS at 18:17

## 2018-08-18 RX ADMIN — INSULIN ASPART 1 UNIT: 100 INJECTION, SOLUTION INTRAVENOUS; SUBCUTANEOUS at 06:13

## 2018-08-18 RX ADMIN — PIPERACILLIN SODIUM AND TAZOBACTAM SODIUM 1 MLS/HR: 3; .375 INJECTION, POWDER, LYOPHILIZED, FOR SOLUTION INTRAVENOUS at 00:26

## 2018-08-18 RX ADMIN — METOPROLOL SUCCINATE 1 MG: 100 TABLET, EXTENDED RELEASE ORAL at 09:00

## 2018-08-18 RX ADMIN — VANCOMYCIN HYDROCHLORIDE 1 MLS/HR: 500 INJECTION, POWDER, LYOPHILIZED, FOR SOLUTION INTRAVENOUS at 15:06

## 2018-08-18 RX ADMIN — PIPERACILLIN SODIUM AND TAZOBACTAM SODIUM 1 MLS/HR: 3; .375 INJECTION, POWDER, LYOPHILIZED, FOR SOLUTION INTRAVENOUS at 06:06

## 2018-08-19 LAB — VANCOMYCIN,TROUGH: 9.5 UG/ML (ref 10–20)

## 2018-08-19 RX ADMIN — PIPERACILLIN SODIUM AND TAZOBACTAM SODIUM 1 MLS/HR: 3; .375 INJECTION, POWDER, LYOPHILIZED, FOR SOLUTION INTRAVENOUS at 18:13

## 2018-08-19 RX ADMIN — MECLIZINE HYDROCHLORIDE 1 MG: 25 TABLET ORAL at 12:56

## 2018-08-19 RX ADMIN — ASCORBIC ACID, VITAMIN A PALMITATE, CHOLECALCIFEROL, THIAMINE HYDROCHLORIDE, RIBOFLAVIN-5 PHOSPHATE SODIUM, PYRIDOXINE HYDROCHLORIDE, NIACINAMIDE, DEXPANTHENOL, ALPHA-TOCOPHEROL ACETATE, VITAMIN K1, FOLIC ACID, BIOTIN, CYANOCOBALAMIN 1 MLS/HR: 200; 3300; 200; 6; 3.6; 6; 40; 15; 10; 150; 600; 60; 5 INJECTION, SOLUTION INTRAVENOUS at 15:50

## 2018-08-19 RX ADMIN — INSULIN ASPART 1 UNIT: 100 INJECTION, SOLUTION INTRAVENOUS; SUBCUTANEOUS at 00:25

## 2018-08-19 RX ADMIN — MECLIZINE HYDROCHLORIDE 1 MG: 25 TABLET ORAL at 21:07

## 2018-08-19 RX ADMIN — INSULIN ASPART 1 UNIT: 100 INJECTION, SOLUTION INTRAVENOUS; SUBCUTANEOUS at 06:01

## 2018-08-19 RX ADMIN — COLLAGENASE SANTYL 1 APPLIC: 250 OINTMENT TOPICAL at 10:11

## 2018-08-19 RX ADMIN — VANCOMYCIN HYDROCHLORIDE 1 MLS/HR: 500 INJECTION, POWDER, LYOPHILIZED, FOR SOLUTION INTRAVENOUS at 02:09

## 2018-08-19 RX ADMIN — PIPERACILLIN SODIUM AND TAZOBACTAM SODIUM 1 MLS/HR: 3; .375 INJECTION, POWDER, LYOPHILIZED, FOR SOLUTION INTRAVENOUS at 05:57

## 2018-08-19 RX ADMIN — INSULIN ASPART 1 UNIT: 100 INJECTION, SOLUTION INTRAVENOUS; SUBCUTANEOUS at 18:12

## 2018-08-19 RX ADMIN — INSULIN ASPART 1 UNIT: 100 INJECTION, SOLUTION INTRAVENOUS; SUBCUTANEOUS at 12:57

## 2018-08-19 RX ADMIN — LISINOPRIL 1 MG: 10 TABLET ORAL at 10:09

## 2018-08-19 RX ADMIN — PIPERACILLIN SODIUM AND TAZOBACTAM SODIUM 1 MLS/HR: 3; .375 INJECTION, POWDER, LYOPHILIZED, FOR SOLUTION INTRAVENOUS at 00:15

## 2018-08-19 RX ADMIN — INSULIN ASPART 1 UNIT: 100 INJECTION, SOLUTION INTRAVENOUS; SUBCUTANEOUS at 23:56

## 2018-08-19 RX ADMIN — HEPARIN SODIUM 1 UNIT: 5000 INJECTION, SOLUTION INTRAVENOUS; SUBCUTANEOUS at 21:18

## 2018-08-19 RX ADMIN — AMLODIPINE BESYLATE 1 MG: 10 TABLET ORAL at 10:09

## 2018-08-19 RX ADMIN — VANCOMYCIN HYDROCHLORIDE 1 MLS/HR: 500 INJECTION, POWDER, LYOPHILIZED, FOR SOLUTION INTRAVENOUS at 15:50

## 2018-08-19 RX ADMIN — PIPERACILLIN SODIUM AND TAZOBACTAM SODIUM 1 MLS/HR: 3; .375 INJECTION, POWDER, LYOPHILIZED, FOR SOLUTION INTRAVENOUS at 12:56

## 2018-08-19 RX ADMIN — PIPERACILLIN SODIUM AND TAZOBACTAM SODIUM 1 MLS/HR: 3; .375 INJECTION, POWDER, LYOPHILIZED, FOR SOLUTION INTRAVENOUS at 23:47

## 2018-08-19 RX ADMIN — ATORVASTATIN CALCIUM 1 MG: 20 TABLET, FILM COATED ORAL at 21:07

## 2018-08-19 RX ADMIN — MECLIZINE HYDROCHLORIDE 1 MG: 25 TABLET ORAL at 10:09

## 2018-08-19 RX ADMIN — HEPARIN SODIUM 1 UNIT: 5000 INJECTION, SOLUTION INTRAVENOUS; SUBCUTANEOUS at 10:11

## 2018-08-20 LAB
BLOOD UREA NITROGEN: 15 MG/DL (ref 7–20)
CREATININE: 0.58 MG/DL (ref 0.44–1)

## 2018-08-20 RX ADMIN — INSULIN ASPART 1 UNIT: 100 INJECTION, SOLUTION INTRAVENOUS; SUBCUTANEOUS at 13:48

## 2018-08-20 RX ADMIN — MECLIZINE HYDROCHLORIDE 1 MG: 25 TABLET ORAL at 22:17

## 2018-08-20 RX ADMIN — HEPARIN SODIUM 1 UNIT: 5000 INJECTION, SOLUTION INTRAVENOUS; SUBCUTANEOUS at 11:01

## 2018-08-20 RX ADMIN — PIPERACILLIN SODIUM AND TAZOBACTAM SODIUM 1 MLS/HR: 3; .375 INJECTION, POWDER, LYOPHILIZED, FOR SOLUTION INTRAVENOUS at 11:21

## 2018-08-20 RX ADMIN — MECLIZINE HYDROCHLORIDE 1 MG: 25 TABLET ORAL at 11:00

## 2018-08-20 RX ADMIN — ATORVASTATIN CALCIUM 1 MG: 20 TABLET, FILM COATED ORAL at 22:17

## 2018-08-20 RX ADMIN — LISINOPRIL 1 MG: 10 TABLET ORAL at 11:00

## 2018-08-20 RX ADMIN — VANCOMYCIN HYDROCHLORIDE 1 MLS/HR: 1 INJECTION, POWDER, LYOPHILIZED, FOR SOLUTION INTRAVENOUS at 13:48

## 2018-08-20 RX ADMIN — ASCORBIC ACID, VITAMIN A PALMITATE, CHOLECALCIFEROL, THIAMINE HYDROCHLORIDE, RIBOFLAVIN-5 PHOSPHATE SODIUM, PYRIDOXINE HYDROCHLORIDE, NIACINAMIDE, DEXPANTHENOL, ALPHA-TOCOPHEROL ACETATE, VITAMIN K1, FOLIC ACID, BIOTIN, CYANOCOBALAMIN 1 MLS/HR: 200; 3300; 200; 6; 3.6; 6; 40; 15; 10; 150; 600; 60; 5 INJECTION, SOLUTION INTRAVENOUS at 04:50

## 2018-08-20 RX ADMIN — MECLIZINE HYDROCHLORIDE 1 MG: 25 TABLET ORAL at 13:48

## 2018-08-20 RX ADMIN — INSULIN ASPART 1 UNIT: 100 INJECTION, SOLUTION INTRAVENOUS; SUBCUTANEOUS at 17:56

## 2018-08-20 RX ADMIN — PIPERACILLIN SODIUM AND TAZOBACTAM SODIUM 1 MLS/HR: 3; .375 INJECTION, POWDER, LYOPHILIZED, FOR SOLUTION INTRAVENOUS at 17:33

## 2018-08-20 RX ADMIN — AMLODIPINE BESYLATE 1 MG: 10 TABLET ORAL at 11:00

## 2018-08-20 RX ADMIN — VANCOMYCIN HYDROCHLORIDE 1 MLS/HR: 1 INJECTION, POWDER, LYOPHILIZED, FOR SOLUTION INTRAVENOUS at 01:31

## 2018-08-20 RX ADMIN — PIPERACILLIN SODIUM AND TAZOBACTAM SODIUM 1 MLS/HR: 3; .375 INJECTION, POWDER, LYOPHILIZED, FOR SOLUTION INTRAVENOUS at 05:34

## 2018-08-20 RX ADMIN — ACETAMINOPHEN 1 MG: 160 SOLUTION ORAL at 22:17

## 2018-08-20 RX ADMIN — COLLAGENASE SANTYL 1 APPLIC: 250 OINTMENT TOPICAL at 11:00

## 2018-08-20 RX ADMIN — INSULIN ASPART 1 UNIT: 100 INJECTION, SOLUTION INTRAVENOUS; SUBCUTANEOUS at 05:44

## 2018-08-20 RX ADMIN — HEPARIN SODIUM 1 UNIT: 5000 INJECTION, SOLUTION INTRAVENOUS; SUBCUTANEOUS at 22:31

## 2018-08-20 RX ADMIN — ASCORBIC ACID, VITAMIN A PALMITATE, CHOLECALCIFEROL, THIAMINE HYDROCHLORIDE, RIBOFLAVIN-5 PHOSPHATE SODIUM, PYRIDOXINE HYDROCHLORIDE, NIACINAMIDE, DEXPANTHENOL, ALPHA-TOCOPHEROL ACETATE, VITAMIN K1, FOLIC ACID, BIOTIN, CYANOCOBALAMIN 1 MLS/HR: 200; 3300; 200; 6; 3.6; 6; 40; 15; 10; 150; 600; 60; 5 INJECTION, SOLUTION INTRAVENOUS at 11:21

## 2018-08-21 RX ADMIN — HEPARIN SODIUM 1 UNIT: 5000 INJECTION, SOLUTION INTRAVENOUS; SUBCUTANEOUS at 21:29

## 2018-08-21 RX ADMIN — INSULIN GLARGINE 1 UNITS: 100 INJECTION, SOLUTION SUBCUTANEOUS at 21:36

## 2018-08-21 RX ADMIN — HEPARIN SODIUM 1 UNIT: 5000 INJECTION, SOLUTION INTRAVENOUS; SUBCUTANEOUS at 11:55

## 2018-08-21 RX ADMIN — PIPERACILLIN SODIUM AND TAZOBACTAM SODIUM 1 MLS/HR: 3; .375 INJECTION, POWDER, LYOPHILIZED, FOR SOLUTION INTRAVENOUS at 00:28

## 2018-08-21 RX ADMIN — COLLAGENASE SANTYL 1 APPLIC: 250 OINTMENT TOPICAL at 08:37

## 2018-08-21 RX ADMIN — MECLIZINE HYDROCHLORIDE 1 MG: 25 TABLET ORAL at 08:37

## 2018-08-21 RX ADMIN — LISINOPRIL 1 MG: 10 TABLET ORAL at 08:37

## 2018-08-21 RX ADMIN — PIPERACILLIN SODIUM AND TAZOBACTAM SODIUM 1 MLS/HR: 3; .375 INJECTION, POWDER, LYOPHILIZED, FOR SOLUTION INTRAVENOUS at 06:29

## 2018-08-21 RX ADMIN — HEPARIN SODIUM 1 UNIT: 5000 INJECTION, SOLUTION INTRAVENOUS; SUBCUTANEOUS at 09:00

## 2018-08-21 RX ADMIN — INSULIN ASPART 1 UNIT: 100 INJECTION, SOLUTION INTRAVENOUS; SUBCUTANEOUS at 17:33

## 2018-08-21 RX ADMIN — VANCOMYCIN HYDROCHLORIDE 1 MLS/HR: 1 INJECTION, POWDER, LYOPHILIZED, FOR SOLUTION INTRAVENOUS at 01:05

## 2018-08-21 RX ADMIN — INSULIN ASPART 1 UNIT: 100 INJECTION, SOLUTION INTRAVENOUS; SUBCUTANEOUS at 00:44

## 2018-08-21 RX ADMIN — VANCOMYCIN HYDROCHLORIDE 1 MLS/HR: 1 INJECTION, POWDER, LYOPHILIZED, FOR SOLUTION INTRAVENOUS at 13:09

## 2018-08-21 RX ADMIN — PIPERACILLIN SODIUM AND TAZOBACTAM SODIUM 1 MLS/HR: 3; .375 INJECTION, POWDER, LYOPHILIZED, FOR SOLUTION INTRAVENOUS at 17:33

## 2018-08-21 RX ADMIN — INSULIN ASPART 1 UNIT: 100 INJECTION, SOLUTION INTRAVENOUS; SUBCUTANEOUS at 12:08

## 2018-08-21 RX ADMIN — AMLODIPINE BESYLATE 1 MG: 10 TABLET ORAL at 08:37

## 2018-08-21 RX ADMIN — PIPERACILLIN SODIUM AND TAZOBACTAM SODIUM 1 MLS/HR: 3; .375 INJECTION, POWDER, LYOPHILIZED, FOR SOLUTION INTRAVENOUS at 11:53

## 2018-08-21 RX ADMIN — INSULIN ASPART 1 UNIT: 100 INJECTION, SOLUTION INTRAVENOUS; SUBCUTANEOUS at 06:36

## 2018-08-22 LAB
ADD MAN DIFF?: NO
ADD UMIC: YES
ANION GAP: 12 (ref 8–16)
BASOPHIL #: 0 10^3/UL (ref 0–0.1)
BASOPHILS %: 0.3 % (ref 0–2)
BLOOD UREA NITROGEN: 23 MG/DL (ref 7–20)
CALCIUM: 8.8 MG/DL (ref 8.4–10.2)
CARBON DIOXIDE: 30 MMOL/L (ref 21–31)
CHLORIDE: 105 MMOL/L (ref 97–110)
CREATININE: 0.63 MG/DL (ref 0.44–1)
EOSINOPHILS #: 0 10^3/UL (ref 0–0.5)
EOSINOPHILS %: 0 % (ref 0–7)
GLUCOSE: 189 MG/DL (ref 70–220)
HEMATOCRIT: 34.5 % (ref 37–47)
HEMOGLOBIN: 10.9 G/DL (ref 12–16)
IMMATURE GRANS #M: 0.05 10^3/UL (ref 0–0.03)
IMMATURE GRANS % (M): 0.4 % (ref 0–0.43)
LACTIC ACID: 2.2 MMOL/L (ref 0.5–2)
LYMPHOCYTES #: 1.8 10^3/UL (ref 0.8–2.9)
LYMPHOCYTES %: 14.8 % (ref 15–51)
MEAN CORPUSCULAR HEMOGLOBIN: 27.5 PG (ref 29–33)
MEAN CORPUSCULAR HGB CONC: 31.6 G/DL (ref 32–37)
MEAN CORPUSCULAR VOLUME: 87.1 FL (ref 82–101)
MEAN PLATELET VOLUME: 10.8 FL (ref 7.4–10.4)
MONOCYTE #: 0.8 10^3/UL (ref 0.3–0.9)
MONOCYTES %: 6.6 % (ref 0–11)
NEUTROPHIL #: 9.2 10^3/UL (ref 1.6–7.5)
NEUTROPHILS %: 77.9 % (ref 39–77)
NUCLEATED RED BLOOD CELLS #: 0 10^3/UL (ref 0–0)
NUCLEATED RED BLOOD CELLS%: 0 /100WBC (ref 0–0)
PLATELET COUNT: 489 10^3/UL (ref 140–415)
POTASSIUM: 2.3 MMOL/L (ref 3.5–5.1)
RED BLOOD COUNT: 3.96 10^6/UL (ref 4.2–5.4)
RED CELL DISTRIBUTION WIDTH: 14.1 % (ref 11.5–14.5)
SODIUM: 145 MMOL/L (ref 135–144)
UR ASCORBIC ACID: 40 MG/DL
UR BILIRUBIN (DIP): NEGATIVE MG/DL
UR BLOOD (DIP): NEGATIVE MG/DL
UR CLARITY: (no result)
UR COLOR: YELLOW
UR GLUCOSE (DIP): NEGATIVE MG/DL
UR KETONES (DIP): NEGATIVE MG/DL
UR LEUKOCYTE ESTERASE (DIP): NEGATIVE LEU/UL
UR NITRITE (DIP): NEGATIVE MG/DL
UR PH (DIP): 6 (ref 5–9)
UR RBC: 7 /HPF (ref 0–5)
UR SPECIFIC GRAVITY (DIP): 1.02 (ref 1–1.03)
UR SQUAMOUS EPITHELIAL CELL: (no result) /HPF
UR TOTAL PROTEIN (DIP): (no result) MG/DL
UR UROBILINOGEN (DIP): NEGATIVE MG/DL
UR WBC: 4 /HPF (ref 0–5)
VANCOMYCIN,TROUGH: 18.1 UG/ML (ref 10–20)
WHITE BLOOD COUNT: 11.9 10^3/UL (ref 4.8–10.8)

## 2018-08-22 RX ADMIN — INSULIN ASPART 1 UNIT: 100 INJECTION, SOLUTION INTRAVENOUS; SUBCUTANEOUS at 00:31

## 2018-08-22 RX ADMIN — VANCOMYCIN HYDROCHLORIDE 1 MLS/HR: 1 INJECTION, POWDER, LYOPHILIZED, FOR SOLUTION INTRAVENOUS at 13:30

## 2018-08-22 RX ADMIN — MUPIROCIN 1 APPLIC: 20 OINTMENT TOPICAL at 20:12

## 2018-08-22 RX ADMIN — CEFEPIME HYDROCHLORIDE 1 MLS/HR: 2 INJECTION, POWDER, FOR SOLUTION INTRAVENOUS at 08:14

## 2018-08-22 RX ADMIN — VANCOMYCIN HYDROCHLORIDE 1 MLS/HR: 500 INJECTION, POWDER, LYOPHILIZED, FOR SOLUTION INTRAVENOUS at 23:00

## 2018-08-22 RX ADMIN — AMLODIPINE BESYLATE 1 MG: 10 TABLET ORAL at 08:06

## 2018-08-22 RX ADMIN — CASTOR OIL AND BALSAM, PERU 1 APPLIC: 788; 87 OINTMENT TOPICAL at 08:06

## 2018-08-22 RX ADMIN — INSULIN GLARGINE 1 UNITS: 100 INJECTION, SOLUTION SUBCUTANEOUS at 20:18

## 2018-08-22 RX ADMIN — ACETAMINOPHEN 1 MG: 160 SOLUTION ORAL at 15:03

## 2018-08-22 RX ADMIN — SODIUM CHLORIDE 1 APPLIC: 0.9 IRRIGANT IRRIGATION at 09:00

## 2018-08-22 RX ADMIN — CEFEPIME HYDROCHLORIDE 1 MLS/HR: 2 INJECTION, POWDER, FOR SOLUTION INTRAVENOUS at 21:55

## 2018-08-22 RX ADMIN — PIPERACILLIN SODIUM AND TAZOBACTAM SODIUM 1 MLS/HR: 3; .375 INJECTION, POWDER, LYOPHILIZED, FOR SOLUTION INTRAVENOUS at 00:25

## 2018-08-22 RX ADMIN — INSULIN ASPART 1 UNIT: 100 INJECTION, SOLUTION INTRAVENOUS; SUBCUTANEOUS at 18:14

## 2018-08-22 RX ADMIN — INSULIN ASPART 1 UNIT: 100 INJECTION, SOLUTION INTRAVENOUS; SUBCUTANEOUS at 12:17

## 2018-08-22 RX ADMIN — POTASSIUM CHLORIDE 1 MLS/HR: 200 INJECTION, SOLUTION INTRAVENOUS at 17:34

## 2018-08-22 RX ADMIN — ACETAMINOPHEN 1 MG: 160 SOLUTION ORAL at 08:06

## 2018-08-22 RX ADMIN — CEFEPIME HYDROCHLORIDE 1 MLS/HR: 2 INJECTION, POWDER, FOR SOLUTION INTRAVENOUS at 01:19

## 2018-08-22 RX ADMIN — VANCOMYCIN HYDROCHLORIDE 1 MLS/HR: 1 INJECTION, POWDER, LYOPHILIZED, FOR SOLUTION INTRAVENOUS at 01:57

## 2018-08-22 RX ADMIN — HEPARIN SODIUM 1 UNIT: 5000 INJECTION, SOLUTION INTRAVENOUS; SUBCUTANEOUS at 08:08

## 2018-08-22 RX ADMIN — INSULIN ASPART 1 UNIT: 100 INJECTION, SOLUTION INTRAVENOUS; SUBCUTANEOUS at 05:49

## 2018-08-22 RX ADMIN — POTASSIUM CHLORIDE 1 MLS/HR: 200 INJECTION, SOLUTION INTRAVENOUS at 22:21

## 2018-08-22 RX ADMIN — POTASSIUM CHLORIDE 1 MLS/HR: 200 INJECTION, SOLUTION INTRAVENOUS at 15:19

## 2018-08-22 RX ADMIN — THIAMINE HYDROCHLORIDE 1 MLS/HR: 100 INJECTION, SOLUTION INTRAMUSCULAR; INTRAVENOUS at 12:52

## 2018-08-22 RX ADMIN — COLLAGENASE SANTYL 1 APPLIC: 250 OINTMENT TOPICAL at 08:06

## 2018-08-22 RX ADMIN — MUPIROCIN 1 APPLIC: 20 OINTMENT TOPICAL at 12:00

## 2018-08-22 RX ADMIN — POTASSIUM CHLORIDE 1 MLS/HR: 200 INJECTION, SOLUTION INTRAVENOUS at 20:07

## 2018-08-22 RX ADMIN — HEPARIN SODIUM 1 UNIT: 5000 INJECTION, SOLUTION INTRAVENOUS; SUBCUTANEOUS at 20:17

## 2018-08-23 LAB
ADD MAN DIFF?: NO
ANION GAP: 13 (ref 8–16)
BASOPHIL #: 0 10^3/UL (ref 0–0.1)
BASOPHILS %: 0.3 % (ref 0–2)
BLOOD UREA NITROGEN: 21 MG/DL (ref 7–20)
CALCIUM: 9.2 MG/DL (ref 8.4–10.2)
CARBON DIOXIDE: 28 MMOL/L (ref 21–31)
CHLORIDE: 102 MMOL/L (ref 97–110)
CREATININE: 0.54 MG/DL (ref 0.44–1)
EOSINOPHILS #: 0 10^3/UL (ref 0–0.5)
EOSINOPHILS %: 0.1 % (ref 0–7)
GLUCOSE: 186 MG/DL (ref 70–220)
HEMATOCRIT: 38.4 % (ref 37–47)
HEMOGLOBIN: 12.1 G/DL (ref 12–16)
IMMATURE GRANS #M: 0.08 10^3/UL (ref 0–0.03)
IMMATURE GRANS % (M): 0.5 % (ref 0–0.43)
LYMPHOCYTES #: 2.3 10^3/UL (ref 0.8–2.9)
LYMPHOCYTES %: 15.7 % (ref 15–51)
MEAN CORPUSCULAR HEMOGLOBIN: 27.1 PG (ref 29–33)
MEAN CORPUSCULAR HGB CONC: 31.5 G/DL (ref 32–37)
MEAN CORPUSCULAR VOLUME: 85.9 FL (ref 82–101)
MEAN PLATELET VOLUME: 10.6 FL (ref 7.4–10.4)
MONOCYTE #: 1.1 10^3/UL (ref 0.3–0.9)
MONOCYTES %: 7.2 % (ref 0–11)
NEUTROPHIL #: 11.2 10^3/UL (ref 1.6–7.5)
NEUTROPHILS %: 76.2 % (ref 39–77)
NUCLEATED RED BLOOD CELLS #: 0 10^3/UL (ref 0–0)
NUCLEATED RED BLOOD CELLS%: 0 /100WBC (ref 0–0)
PLATELET COUNT: 465 10^3/UL (ref 140–415)
POTASSIUM: 2.8 MMOL/L (ref 3.5–5.1)
RED BLOOD COUNT: 4.47 10^6/UL (ref 4.2–5.4)
RED CELL DISTRIBUTION WIDTH: 14.2 % (ref 11.5–14.5)
SODIUM: 140 MMOL/L (ref 135–144)
VANCOMYCIN,TROUGH: 16.9 UG/ML (ref 10–20)
WHITE BLOOD COUNT: 14.7 10^3/UL (ref 4.8–10.8)

## 2018-08-23 RX ADMIN — INSULIN ASPART 1 UNIT: 100 INJECTION, SOLUTION INTRAVENOUS; SUBCUTANEOUS at 06:10

## 2018-08-23 RX ADMIN — MEROPENEM 1 MLS/HR: 1 INJECTION, POWDER, FOR SOLUTION INTRAVENOUS at 15:19

## 2018-08-23 RX ADMIN — INSULIN GLARGINE 1 UNITS: 100 INJECTION, SOLUTION SUBCUTANEOUS at 20:13

## 2018-08-23 RX ADMIN — COLLAGENASE SANTYL 1 APPLIC: 250 OINTMENT TOPICAL at 08:36

## 2018-08-23 RX ADMIN — SODIUM CHLORIDE 1 APPLIC: 0.9 IRRIGANT IRRIGATION at 08:36

## 2018-08-23 RX ADMIN — POTASSIUM CHLORIDE 1 MLS/HR: 200 INJECTION, SOLUTION INTRAVENOUS at 17:43

## 2018-08-23 RX ADMIN — VANCOMYCIN HYDROCHLORIDE 1 MLS/HR: 500 INJECTION, POWDER, LYOPHILIZED, FOR SOLUTION INTRAVENOUS at 22:36

## 2018-08-23 RX ADMIN — ACETAMINOPHEN 1 MG: 160 SOLUTION ORAL at 01:51

## 2018-08-23 RX ADMIN — MUPIROCIN 1 APPLIC: 20 OINTMENT TOPICAL at 20:11

## 2018-08-23 RX ADMIN — AMLODIPINE BESYLATE 1 MG: 10 TABLET ORAL at 08:32

## 2018-08-23 RX ADMIN — INSULIN ASPART 1 UNIT: 100 INJECTION, SOLUTION INTRAVENOUS; SUBCUTANEOUS at 00:15

## 2018-08-23 RX ADMIN — MUPIROCIN 1 APPLIC: 20 OINTMENT TOPICAL at 08:36

## 2018-08-23 RX ADMIN — POTASSIUM CHLORIDE 1 MLS/HR: 200 INJECTION, SOLUTION INTRAVENOUS at 22:34

## 2018-08-23 RX ADMIN — CASTOR OIL AND BALSAM, PERU 1 APPLIC: 788; 87 OINTMENT TOPICAL at 08:36

## 2018-08-23 RX ADMIN — MEROPENEM 1 MLS/HR: 1 INJECTION, POWDER, FOR SOLUTION INTRAVENOUS at 21:58

## 2018-08-23 RX ADMIN — HEPARIN SODIUM 1 UNIT: 5000 INJECTION, SOLUTION INTRAVENOUS; SUBCUTANEOUS at 08:36

## 2018-08-23 RX ADMIN — IOHEXOL 1 ML: 300 INJECTION, SOLUTION INTRAVENOUS at 17:19

## 2018-08-23 RX ADMIN — VANCOMYCIN HYDROCHLORIDE 1 MLS/HR: 500 INJECTION, POWDER, LYOPHILIZED, FOR SOLUTION INTRAVENOUS at 06:08

## 2018-08-23 RX ADMIN — MEROPENEM 1 MLS/HR: 1 INJECTION, POWDER, FOR SOLUTION INTRAVENOUS at 05:19

## 2018-08-23 RX ADMIN — VASOPRESSIN 1 ML/S: 20 INJECTION, SOLUTION INTRAMUSCULAR; SUBCUTANEOUS at 17:18

## 2018-08-23 RX ADMIN — POTASSIUM CHLORIDE 1 MLS/HR: 200 INJECTION, SOLUTION INTRAVENOUS at 20:06

## 2018-08-23 RX ADMIN — INSULIN ASPART 1 UNIT: 100 INJECTION, SOLUTION INTRAVENOUS; SUBCUTANEOUS at 12:25

## 2018-08-23 RX ADMIN — VANCOMYCIN HYDROCHLORIDE 1 MLS/HR: 500 INJECTION, POWDER, LYOPHILIZED, FOR SOLUTION INTRAVENOUS at 15:22

## 2018-08-23 RX ADMIN — INSULIN ASPART 1 UNIT: 100 INJECTION, SOLUTION INTRAVENOUS; SUBCUTANEOUS at 17:43

## 2018-08-23 RX ADMIN — HEPARIN SODIUM 1 UNIT: 5000 INJECTION, SOLUTION INTRAVENOUS; SUBCUTANEOUS at 20:12

## 2018-08-23 RX ADMIN — FLUCONAZOLE, SODIUM CHLORIDE 1 MLS/HR: 2 INJECTION INTRAVENOUS at 02:54

## 2018-08-23 RX ADMIN — SODIUM CHLORIDE 1 ML: 9 INJECTION, SOLUTION INTRAMUSCULAR; INTRAVENOUS; SUBCUTANEOUS at 17:18

## 2018-08-23 RX ADMIN — POTASSIUM CHLORIDE 1 MEQ: 1.5 POWDER, FOR SOLUTION ORAL at 17:43

## 2018-08-24 RX ADMIN — INSULIN ASPART 1 UNIT: 100 INJECTION, SOLUTION INTRAVENOUS; SUBCUTANEOUS at 00:19

## 2018-08-24 RX ADMIN — INSULIN ASPART 1 UNIT: 100 INJECTION, SOLUTION INTRAVENOUS; SUBCUTANEOUS at 18:03

## 2018-08-24 RX ADMIN — MEROPENEM 1 MLS/HR: 1 INJECTION, POWDER, FOR SOLUTION INTRAVENOUS at 05:32

## 2018-08-24 RX ADMIN — SODIUM CHLORIDE 1 APPLIC: 0.9 IRRIGANT IRRIGATION at 09:49

## 2018-08-24 RX ADMIN — ACETAMINOPHEN 1 MG: 160 SOLUTION ORAL at 02:32

## 2018-08-24 RX ADMIN — INSULIN ASPART 1 UNIT: 100 INJECTION, SOLUTION INTRAVENOUS; SUBCUTANEOUS at 05:34

## 2018-08-24 RX ADMIN — HEPARIN SODIUM 1 UNIT: 5000 INJECTION, SOLUTION INTRAVENOUS; SUBCUTANEOUS at 09:50

## 2018-08-24 RX ADMIN — FLUCONAZOLE, SODIUM CHLORIDE 1 MLS/HR: 2 INJECTION INTRAVENOUS at 02:39

## 2018-08-24 RX ADMIN — MEROPENEM 1 MLS/HR: 1 INJECTION, POWDER, FOR SOLUTION INTRAVENOUS at 13:14

## 2018-08-24 RX ADMIN — MUPIROCIN 1 APPLIC: 20 OINTMENT TOPICAL at 09:49

## 2018-08-24 RX ADMIN — VANCOMYCIN HYDROCHLORIDE 1 MLS/HR: 500 INJECTION, POWDER, LYOPHILIZED, FOR SOLUTION INTRAVENOUS at 06:31

## 2018-08-24 RX ADMIN — INSULIN GLARGINE 1 UNITS: 100 INJECTION, SOLUTION SUBCUTANEOUS at 20:19

## 2018-08-24 RX ADMIN — CASTOR OIL AND BALSAM, PERU 1 APPLIC: 788; 87 OINTMENT TOPICAL at 09:50

## 2018-08-24 RX ADMIN — AMLODIPINE BESYLATE 1 MG: 10 TABLET ORAL at 09:49

## 2018-08-24 RX ADMIN — VANCOMYCIN HYDROCHLORIDE 1 MLS/HR: 500 INJECTION, POWDER, LYOPHILIZED, FOR SOLUTION INTRAVENOUS at 22:57

## 2018-08-24 RX ADMIN — COLLAGENASE SANTYL 1 APPLIC: 250 OINTMENT TOPICAL at 09:51

## 2018-08-24 RX ADMIN — INSULIN ASPART 1 UNIT: 100 INJECTION, SOLUTION INTRAVENOUS; SUBCUTANEOUS at 12:18

## 2018-08-24 RX ADMIN — MEROPENEM 1 MLS/HR: 1 INJECTION, POWDER, FOR SOLUTION INTRAVENOUS at 21:39

## 2018-08-24 RX ADMIN — COLLAGENASE SANTYL 1 APPLIC: 250 OINTMENT TOPICAL at 09:48

## 2018-08-24 RX ADMIN — POTASSIUM CHLORIDE 1 MLS/HR: 200 INJECTION, SOLUTION INTRAVENOUS at 00:43

## 2018-08-24 RX ADMIN — MUPIROCIN 1 APPLIC: 20 OINTMENT TOPICAL at 20:21

## 2018-08-24 RX ADMIN — HEPARIN SODIUM 1 UNIT: 5000 INJECTION, SOLUTION INTRAVENOUS; SUBCUTANEOUS at 20:20

## 2018-08-24 RX ADMIN — VANCOMYCIN HYDROCHLORIDE 1 MLS/HR: 500 INJECTION, POWDER, LYOPHILIZED, FOR SOLUTION INTRAVENOUS at 13:14

## 2018-08-24 RX ADMIN — INSULIN ASPART 1 UNIT: 100 INJECTION, SOLUTION INTRAVENOUS; SUBCUTANEOUS at 23:55

## 2018-08-25 LAB
BLOOD UREA NITROGEN: 29 MG/DL (ref 7–20)
CREATININE: 0.5 MG/DL (ref 0.44–1)
VANCOMYCIN,TROUGH: 14.5 UG/ML (ref 10–20)

## 2018-08-25 RX ADMIN — MEROPENEM 1 MLS/HR: 1 INJECTION, POWDER, FOR SOLUTION INTRAVENOUS at 05:06

## 2018-08-25 RX ADMIN — COLLAGENASE SANTYL 1 APPLIC: 250 OINTMENT TOPICAL at 08:42

## 2018-08-25 RX ADMIN — VANCOMYCIN HYDROCHLORIDE 1 MLS/HR: 500 INJECTION, POWDER, LYOPHILIZED, FOR SOLUTION INTRAVENOUS at 14:10

## 2018-08-25 RX ADMIN — INSULIN ASPART 1 UNIT: 100 INJECTION, SOLUTION INTRAVENOUS; SUBCUTANEOUS at 05:59

## 2018-08-25 RX ADMIN — ACETAMINOPHEN 1 MG: 160 SOLUTION ORAL at 02:01

## 2018-08-25 RX ADMIN — SODIUM CHLORIDE 1 APPLIC: 0.9 IRRIGANT IRRIGATION at 08:42

## 2018-08-25 RX ADMIN — MEROPENEM 1 MLS/HR: 1 INJECTION, POWDER, FOR SOLUTION INTRAVENOUS at 21:09

## 2018-08-25 RX ADMIN — MUPIROCIN 1 APPLIC: 20 OINTMENT TOPICAL at 08:43

## 2018-08-25 RX ADMIN — INSULIN ASPART 1 UNIT: 100 INJECTION, SOLUTION INTRAVENOUS; SUBCUTANEOUS at 23:58

## 2018-08-25 RX ADMIN — HEPARIN SODIUM 1 UNIT: 5000 INJECTION, SOLUTION INTRAVENOUS; SUBCUTANEOUS at 08:41

## 2018-08-25 RX ADMIN — INSULIN ASPART 1 UNIT: 100 INJECTION, SOLUTION INTRAVENOUS; SUBCUTANEOUS at 12:24

## 2018-08-25 RX ADMIN — INSULIN ASPART 1 UNIT: 100 INJECTION, SOLUTION INTRAVENOUS; SUBCUTANEOUS at 18:01

## 2018-08-25 RX ADMIN — MORPHINE SULFATE 1 MG: 2 INJECTION, SOLUTION INTRAMUSCULAR; INTRAVENOUS at 02:04

## 2018-08-25 RX ADMIN — CASTOR OIL AND BALSAM, PERU 1 APPLIC: 788; 87 OINTMENT TOPICAL at 08:43

## 2018-08-25 RX ADMIN — VANCOMYCIN HYDROCHLORIDE 1 MLS/HR: 500 INJECTION, POWDER, LYOPHILIZED, FOR SOLUTION INTRAVENOUS at 23:10

## 2018-08-25 RX ADMIN — MUPIROCIN 1 APPLIC: 20 OINTMENT TOPICAL at 21:05

## 2018-08-25 RX ADMIN — MEROPENEM 1 MLS/HR: 1 INJECTION, POWDER, FOR SOLUTION INTRAVENOUS at 13:06

## 2018-08-25 RX ADMIN — FLUCONAZOLE, SODIUM CHLORIDE 1 MLS/HR: 2 INJECTION INTRAVENOUS at 02:47

## 2018-08-25 RX ADMIN — HEPARIN SODIUM 1 UNIT: 5000 INJECTION, SOLUTION INTRAVENOUS; SUBCUTANEOUS at 21:06

## 2018-08-25 RX ADMIN — COLLAGENASE SANTYL 1 APPLIC: 250 OINTMENT TOPICAL at 08:41

## 2018-08-25 RX ADMIN — ACETAMINOPHEN 1 MG: 160 SOLUTION ORAL at 08:42

## 2018-08-25 RX ADMIN — ACETAMINOPHEN 1 MG: 160 SOLUTION ORAL at 14:25

## 2018-08-25 RX ADMIN — VANCOMYCIN HYDROCHLORIDE 1 MLS/HR: 500 INJECTION, POWDER, LYOPHILIZED, FOR SOLUTION INTRAVENOUS at 06:01

## 2018-08-25 RX ADMIN — AMLODIPINE BESYLATE 1 MG: 10 TABLET ORAL at 08:42

## 2018-08-25 RX ADMIN — INSULIN GLARGINE 1 UNITS: 100 INJECTION, SOLUTION SUBCUTANEOUS at 21:07

## 2018-08-26 LAB
ANION GAP: 11 (ref 8–16)
BLOOD UREA NITROGEN: 29 MG/DL (ref 7–20)
CALCIUM: 9 MG/DL (ref 8.4–10.2)
CARBON DIOXIDE: 31 MMOL/L (ref 21–31)
CHLORIDE: 104 MMOL/L (ref 97–110)
CREATININE: 0.58 MG/DL (ref 0.44–1)
GLUCOSE: 187 MG/DL (ref 70–220)
POTASSIUM: 3.1 MMOL/L (ref 3.5–5.1)
SODIUM: 143 MMOL/L (ref 135–144)

## 2018-08-26 RX ADMIN — MUPIROCIN 1 APPLIC: 20 OINTMENT TOPICAL at 20:18

## 2018-08-26 RX ADMIN — AMLODIPINE BESYLATE 1 MG: 10 TABLET ORAL at 09:10

## 2018-08-26 RX ADMIN — INSULIN GLARGINE 1 UNITS: 100 INJECTION, SOLUTION SUBCUTANEOUS at 20:17

## 2018-08-26 RX ADMIN — MUPIROCIN 1 APPLIC: 20 OINTMENT TOPICAL at 09:13

## 2018-08-26 RX ADMIN — INSULIN ASPART 1 UNIT: 100 INJECTION, SOLUTION INTRAVENOUS; SUBCUTANEOUS at 12:12

## 2018-08-26 RX ADMIN — HEPARIN SODIUM 1 UNIT: 5000 INJECTION, SOLUTION INTRAVENOUS; SUBCUTANEOUS at 09:12

## 2018-08-26 RX ADMIN — HEPARIN SODIUM 1 UNIT: 5000 INJECTION, SOLUTION INTRAVENOUS; SUBCUTANEOUS at 20:17

## 2018-08-26 RX ADMIN — MEROPENEM 1 MLS/HR: 1 INJECTION, POWDER, FOR SOLUTION INTRAVENOUS at 14:56

## 2018-08-26 RX ADMIN — POTASSIUM CHLORIDE 1 MEQ: 1500 TABLET, EXTENDED RELEASE ORAL at 14:56

## 2018-08-26 RX ADMIN — MEROPENEM 1 MLS/HR: 1 INJECTION, POWDER, FOR SOLUTION INTRAVENOUS at 21:03

## 2018-08-26 RX ADMIN — VANCOMYCIN HYDROCHLORIDE 1 MLS/HR: 500 INJECTION, POWDER, LYOPHILIZED, FOR SOLUTION INTRAVENOUS at 06:32

## 2018-08-26 RX ADMIN — INSULIN ASPART 1 UNIT: 100 INJECTION, SOLUTION INTRAVENOUS; SUBCUTANEOUS at 18:04

## 2018-08-26 RX ADMIN — SODIUM CHLORIDE 1 APPLIC: 0.9 IRRIGANT IRRIGATION at 09:13

## 2018-08-26 RX ADMIN — ACETAMINOPHEN 1 MG: 160 SOLUTION ORAL at 21:03

## 2018-08-26 RX ADMIN — VANCOMYCIN HYDROCHLORIDE 1 MLS/HR: 500 INJECTION, POWDER, LYOPHILIZED, FOR SOLUTION INTRAVENOUS at 13:35

## 2018-08-26 RX ADMIN — COLLAGENASE SANTYL 1 APPLIC: 250 OINTMENT TOPICAL at 09:12

## 2018-08-26 RX ADMIN — INSULIN ASPART 1 UNIT: 100 INJECTION, SOLUTION INTRAVENOUS; SUBCUTANEOUS at 05:55

## 2018-08-26 RX ADMIN — VANCOMYCIN HYDROCHLORIDE 1 MLS/HR: 500 INJECTION, POWDER, LYOPHILIZED, FOR SOLUTION INTRAVENOUS at 21:41

## 2018-08-26 RX ADMIN — CASTOR OIL AND BALSAM, PERU 1 APPLIC: 788; 87 OINTMENT TOPICAL at 09:14

## 2018-08-26 RX ADMIN — MEROPENEM 1 MLS/HR: 1 INJECTION, POWDER, FOR SOLUTION INTRAVENOUS at 05:52

## 2018-08-26 RX ADMIN — FLUCONAZOLE, SODIUM CHLORIDE 1 MLS/HR: 2 INJECTION INTRAVENOUS at 02:12

## 2018-08-26 RX ADMIN — ACETAMINOPHEN 1 MG: 160 SOLUTION ORAL at 02:12

## 2018-08-27 RX ADMIN — MUPIROCIN 1 APPLIC: 20 OINTMENT TOPICAL at 20:50

## 2018-08-27 RX ADMIN — CASTOR OIL AND BALSAM, PERU 1 APPLIC: 788; 87 OINTMENT TOPICAL at 08:47

## 2018-08-27 RX ADMIN — FLUCONAZOLE, SODIUM CHLORIDE 1 MLS/HR: 2 INJECTION INTRAVENOUS at 02:33

## 2018-08-27 RX ADMIN — COLLAGENASE SANTYL 1 APPLIC: 250 OINTMENT TOPICAL at 08:46

## 2018-08-27 RX ADMIN — VANCOMYCIN HYDROCHLORIDE 1 MLS/HR: 500 INJECTION, POWDER, LYOPHILIZED, FOR SOLUTION INTRAVENOUS at 13:58

## 2018-08-27 RX ADMIN — AMLODIPINE BESYLATE 1 MG: 10 TABLET ORAL at 08:46

## 2018-08-27 RX ADMIN — INSULIN ASPART 1 UNIT: 100 INJECTION, SOLUTION INTRAVENOUS; SUBCUTANEOUS at 12:24

## 2018-08-27 RX ADMIN — HEPARIN SODIUM 1 UNIT: 5000 INJECTION, SOLUTION INTRAVENOUS; SUBCUTANEOUS at 21:00

## 2018-08-27 RX ADMIN — ACETAMINOPHEN 1 MG: 160 SOLUTION ORAL at 03:05

## 2018-08-27 RX ADMIN — INSULIN ASPART 1 UNIT: 100 INJECTION, SOLUTION INTRAVENOUS; SUBCUTANEOUS at 05:39

## 2018-08-27 RX ADMIN — MEROPENEM 1 MLS/HR: 1 INJECTION, POWDER, FOR SOLUTION INTRAVENOUS at 22:08

## 2018-08-27 RX ADMIN — INSULIN ASPART 1 UNIT: 100 INJECTION, SOLUTION INTRAVENOUS; SUBCUTANEOUS at 00:01

## 2018-08-27 RX ADMIN — SODIUM CHLORIDE 1 APPLIC: 0.9 IRRIGANT IRRIGATION at 08:48

## 2018-08-27 RX ADMIN — INSULIN GLARGINE 1 UNITS: 100 INJECTION, SOLUTION SUBCUTANEOUS at 20:59

## 2018-08-27 RX ADMIN — VANCOMYCIN HYDROCHLORIDE 1 MLS/HR: 500 INJECTION, POWDER, LYOPHILIZED, FOR SOLUTION INTRAVENOUS at 23:54

## 2018-08-27 RX ADMIN — MEROPENEM 1 MLS/HR: 1 INJECTION, POWDER, FOR SOLUTION INTRAVENOUS at 05:38

## 2018-08-27 RX ADMIN — MEROPENEM 1 MLS/HR: 1 INJECTION, POWDER, FOR SOLUTION INTRAVENOUS at 15:37

## 2018-08-27 RX ADMIN — MUPIROCIN 1 APPLIC: 20 OINTMENT TOPICAL at 08:47

## 2018-08-27 RX ADMIN — HEPARIN SODIUM 1 UNIT: 5000 INJECTION, SOLUTION INTRAVENOUS; SUBCUTANEOUS at 08:52

## 2018-08-27 RX ADMIN — VANCOMYCIN HYDROCHLORIDE 1 MLS/HR: 500 INJECTION, POWDER, LYOPHILIZED, FOR SOLUTION INTRAVENOUS at 06:24

## 2018-08-27 RX ADMIN — INSULIN ASPART 1 UNIT: 100 INJECTION, SOLUTION INTRAVENOUS; SUBCUTANEOUS at 18:08

## 2018-08-28 LAB
ADD MAN DIFF?: NO
ALANINE AMINOTRANSFERASE: 20 IU/L (ref 13–69)
ALBUMIN/GLOBULIN RATIO: 0.87
ALBUMIN: 3.5 G/DL (ref 3.3–4.9)
ALKALINE PHOSPHATASE: 199 IU/L (ref 42–121)
ANION GAP: 11 (ref 8–16)
ASPARTATE AMINO TRANSFERASE: 26 IU/L (ref 15–46)
BASOPHIL #: 0 10^3/UL (ref 0–0.1)
BASOPHILS %: 0.1 % (ref 0–2)
BILIRUBIN,DIRECT: 0 MG/DL (ref 0–0.2)
BILIRUBIN,TOTAL: 0.2 MG/DL (ref 0.2–1.3)
BLOOD UREA NITROGEN: 31 MG/DL (ref 7–20)
CALCIUM: 9.1 MG/DL (ref 8.4–10.2)
CARBON DIOXIDE: 31 MMOL/L (ref 21–31)
CHLORIDE: 104 MMOL/L (ref 97–110)
CREATININE: 0.54 MG/DL (ref 0.44–1)
EOSINOPHILS #: 0 10^3/UL (ref 0–0.5)
EOSINOPHILS %: 0.3 % (ref 0–7)
ERYTHROCYTE SEDIMENTATION RATE: 68 MM/HR (ref 0–30)
GLOBULIN: 4 G/DL (ref 1.3–3.2)
GLUCOSE: 192 MG/DL (ref 70–220)
HEMATOCRIT: 37.3 % (ref 37–47)
HEMOGLOBIN: 11.7 G/DL (ref 12–16)
IMMATURE GRANS #M: 0.06 10^3/UL (ref 0–0.03)
IMMATURE GRANS % (M): 0.5 % (ref 0–0.43)
LYMPHOCYTES #: 2.1 10^3/UL (ref 0.8–2.9)
LYMPHOCYTES %: 16.3 % (ref 15–51)
MEAN CORPUSCULAR HEMOGLOBIN: 28.1 PG (ref 29–33)
MEAN CORPUSCULAR HGB CONC: 31.4 G/DL (ref 32–37)
MEAN CORPUSCULAR VOLUME: 89.4 FL (ref 82–101)
MEAN PLATELET VOLUME: 12.7 FL (ref 7.4–10.4)
MONOCYTE #: 0.9 10^3/UL (ref 0.3–0.9)
MONOCYTES %: 7.1 % (ref 0–11)
NEUTROPHIL #: 9.9 10^3/UL (ref 1.6–7.5)
NEUTROPHILS %: 75.7 % (ref 39–77)
NUCLEATED RED BLOOD CELLS #: 0 10^3/UL (ref 0–0)
NUCLEATED RED BLOOD CELLS%: 0 /100WBC (ref 0–0)
PLATELET COUNT: 264 10^3/UL (ref 140–415)
POTASSIUM: 2.9 MMOL/L (ref 3.5–5.1)
RED BLOOD COUNT: 4.17 10^6/UL (ref 4.2–5.4)
RED CELL DISTRIBUTION WIDTH: 14.9 % (ref 11.5–14.5)
SODIUM: 143 MMOL/L (ref 135–144)
TOTAL PROTEIN: 7.5 G/DL (ref 6.1–8.1)
VANCOMYCIN,TROUGH: 12.7 UG/ML (ref 10–20)
WHITE BLOOD COUNT: 13.1 10^3/UL (ref 4.8–10.8)

## 2018-08-28 RX ADMIN — MUPIROCIN 1 APPLIC: 20 OINTMENT TOPICAL at 08:10

## 2018-08-28 RX ADMIN — INSULIN ASPART 1 UNIT: 100 INJECTION, SOLUTION INTRAVENOUS; SUBCUTANEOUS at 06:27

## 2018-08-28 RX ADMIN — VANCOMYCIN HYDROCHLORIDE 1 MLS/HR: 500 INJECTION, POWDER, LYOPHILIZED, FOR SOLUTION INTRAVENOUS at 22:19

## 2018-08-28 RX ADMIN — MEROPENEM 1 MLS/HR: 1 INJECTION, POWDER, FOR SOLUTION INTRAVENOUS at 21:37

## 2018-08-28 RX ADMIN — HEPARIN SODIUM 1 UNIT: 5000 INJECTION, SOLUTION INTRAVENOUS; SUBCUTANEOUS at 20:27

## 2018-08-28 RX ADMIN — INSULIN ASPART 1 UNIT: 100 INJECTION, SOLUTION INTRAVENOUS; SUBCUTANEOUS at 18:10

## 2018-08-28 RX ADMIN — MUPIROCIN 1 APPLIC: 20 OINTMENT TOPICAL at 20:19

## 2018-08-28 RX ADMIN — POTASSIUM CHLORIDE 1 MLS/HR: 200 INJECTION, SOLUTION INTRAVENOUS at 16:42

## 2018-08-28 RX ADMIN — INSULIN ASPART 1 UNIT: 100 INJECTION, SOLUTION INTRAVENOUS; SUBCUTANEOUS at 12:19

## 2018-08-28 RX ADMIN — MEROPENEM 1 MLS/HR: 1 INJECTION, POWDER, FOR SOLUTION INTRAVENOUS at 15:30

## 2018-08-28 RX ADMIN — INSULIN GLARGINE 1 UNITS: 100 INJECTION, SOLUTION SUBCUTANEOUS at 20:27

## 2018-08-28 RX ADMIN — VANCOMYCIN HYDROCHLORIDE 1 MLS/HR: 500 INJECTION, POWDER, LYOPHILIZED, FOR SOLUTION INTRAVENOUS at 14:30

## 2018-08-28 RX ADMIN — CASTOR OIL AND BALSAM, PERU 1 APPLIC: 788; 87 OINTMENT TOPICAL at 08:11

## 2018-08-28 RX ADMIN — HEPARIN SODIUM 1 UNIT: 5000 INJECTION, SOLUTION INTRAVENOUS; SUBCUTANEOUS at 08:10

## 2018-08-28 RX ADMIN — MEROPENEM 1 MLS/HR: 1 INJECTION, POWDER, FOR SOLUTION INTRAVENOUS at 06:14

## 2018-08-28 RX ADMIN — AMLODIPINE BESYLATE 1 MG: 10 TABLET ORAL at 08:10

## 2018-08-28 RX ADMIN — FLUCONAZOLE, SODIUM CHLORIDE 1 MLS/HR: 2 INJECTION INTRAVENOUS at 05:03

## 2018-08-28 RX ADMIN — VANCOMYCIN HYDROCHLORIDE 1 MLS/HR: 500 INJECTION, POWDER, LYOPHILIZED, FOR SOLUTION INTRAVENOUS at 06:48

## 2018-08-28 RX ADMIN — INSULIN ASPART 1 UNIT: 100 INJECTION, SOLUTION INTRAVENOUS; SUBCUTANEOUS at 00:11

## 2018-08-28 RX ADMIN — SODIUM CHLORIDE 1 APPLIC: 0.9 IRRIGANT IRRIGATION at 08:10

## 2018-08-28 RX ADMIN — POTASSIUM CHLORIDE 1 MLS/HR: 200 INJECTION, SOLUTION INTRAVENOUS at 12:08

## 2018-08-28 RX ADMIN — COLLAGENASE SANTYL 1 APPLIC: 250 OINTMENT TOPICAL at 08:09

## 2018-08-29 LAB
ANION GAP: 11 (ref 8–16)
BLOOD UREA NITROGEN: 27 MG/DL (ref 7–20)
CALCIUM: 9.3 MG/DL (ref 8.4–10.2)
CARBON DIOXIDE: 31 MMOL/L (ref 21–31)
CHLORIDE: 104 MMOL/L (ref 97–110)
CREATININE: 0.49 MG/DL (ref 0.44–1)
GLUCOSE: 146 MG/DL (ref 70–220)
POTASSIUM: 3.5 MMOL/L (ref 3.5–5.1)
SODIUM: 142 MMOL/L (ref 135–144)

## 2018-08-29 RX ADMIN — COLLAGENASE SANTYL 1 APPLIC: 250 OINTMENT TOPICAL at 09:43

## 2018-08-29 RX ADMIN — VANCOMYCIN HYDROCHLORIDE 1 MLS/HR: 500 INJECTION, POWDER, LYOPHILIZED, FOR SOLUTION INTRAVENOUS at 06:05

## 2018-08-29 RX ADMIN — SODIUM CHLORIDE 1 APPLIC: 0.9 IRRIGANT IRRIGATION at 09:44

## 2018-08-29 RX ADMIN — INSULIN GLARGINE 1 UNITS: 100 INJECTION, SOLUTION SUBCUTANEOUS at 21:25

## 2018-08-29 RX ADMIN — MUPIROCIN 1 APPLIC: 20 OINTMENT TOPICAL at 09:47

## 2018-08-29 RX ADMIN — AMLODIPINE BESYLATE 1 MG: 10 TABLET ORAL at 09:45

## 2018-08-29 RX ADMIN — VANCOMYCIN HYDROCHLORIDE 1 MLS/HR: 500 INJECTION, POWDER, LYOPHILIZED, FOR SOLUTION INTRAVENOUS at 23:46

## 2018-08-29 RX ADMIN — CASTOR OIL AND BALSAM, PERU 1 APPLIC: 788; 87 OINTMENT TOPICAL at 09:44

## 2018-08-29 RX ADMIN — INSULIN ASPART 1 UNIT: 100 INJECTION, SOLUTION INTRAVENOUS; SUBCUTANEOUS at 06:14

## 2018-08-29 RX ADMIN — MEROPENEM 1 MLS/HR: 1 INJECTION, POWDER, FOR SOLUTION INTRAVENOUS at 05:33

## 2018-08-29 RX ADMIN — INSULIN ASPART 1 UNIT: 100 INJECTION, SOLUTION INTRAVENOUS; SUBCUTANEOUS at 11:52

## 2018-08-29 RX ADMIN — MEROPENEM 1 MLS/HR: 1 INJECTION, POWDER, FOR SOLUTION INTRAVENOUS at 13:15

## 2018-08-29 RX ADMIN — MUPIROCIN 1 APPLIC: 20 OINTMENT TOPICAL at 21:28

## 2018-08-29 RX ADMIN — FLUCONAZOLE, SODIUM CHLORIDE 1 MLS/HR: 2 INJECTION INTRAVENOUS at 02:45

## 2018-08-29 RX ADMIN — MEROPENEM 1 MLS/HR: 1 INJECTION, POWDER, FOR SOLUTION INTRAVENOUS at 21:28

## 2018-08-29 RX ADMIN — HEPARIN SODIUM 1 UNIT: 5000 INJECTION, SOLUTION INTRAVENOUS; SUBCUTANEOUS at 21:24

## 2018-08-29 RX ADMIN — HEPARIN SODIUM 1 UNIT: 5000 INJECTION, SOLUTION INTRAVENOUS; SUBCUTANEOUS at 09:48

## 2018-08-29 RX ADMIN — INSULIN ASPART 1 UNIT: 100 INJECTION, SOLUTION INTRAVENOUS; SUBCUTANEOUS at 18:14

## 2018-08-29 RX ADMIN — INSULIN ASPART 1 UNIT: 100 INJECTION, SOLUTION INTRAVENOUS; SUBCUTANEOUS at 00:17

## 2018-08-29 RX ADMIN — VANCOMYCIN HYDROCHLORIDE 1 MLS/HR: 500 INJECTION, POWDER, LYOPHILIZED, FOR SOLUTION INTRAVENOUS at 16:00

## 2018-08-30 RX ADMIN — VANCOMYCIN HYDROCHLORIDE 1 MLS/HR: 500 INJECTION, POWDER, LYOPHILIZED, FOR SOLUTION INTRAVENOUS at 06:53

## 2018-08-30 RX ADMIN — INSULIN ASPART 1 UNIT: 100 INJECTION, SOLUTION INTRAVENOUS; SUBCUTANEOUS at 06:27

## 2018-08-30 RX ADMIN — VANCOMYCIN HYDROCHLORIDE 1 MLS/HR: 500 INJECTION, POWDER, LYOPHILIZED, FOR SOLUTION INTRAVENOUS at 14:55

## 2018-08-30 RX ADMIN — MUPIROCIN 1 APPLIC: 20 OINTMENT TOPICAL at 10:02

## 2018-08-30 RX ADMIN — SODIUM CHLORIDE 1 APPLIC: 0.9 IRRIGANT IRRIGATION at 10:02

## 2018-08-30 RX ADMIN — FLUCONAZOLE, SODIUM CHLORIDE 1 MLS/HR: 2 INJECTION INTRAVENOUS at 04:19

## 2018-08-30 RX ADMIN — INSULIN ASPART 1 UNIT: 100 INJECTION, SOLUTION INTRAVENOUS; SUBCUTANEOUS at 12:16

## 2018-08-30 RX ADMIN — MEROPENEM 1 MLS/HR: 1 INJECTION, POWDER, FOR SOLUTION INTRAVENOUS at 05:20

## 2018-08-30 RX ADMIN — LIDOCAINE HYDROCHLORIDE 1 MLS/HR: 10 INJECTION, SOLUTION EPIDURAL; INFILTRATION; INTRACAUDAL; PERINEURAL at 01:13

## 2018-08-30 RX ADMIN — MEROPENEM 1 MLS/HR: 1 INJECTION, POWDER, FOR SOLUTION INTRAVENOUS at 14:55

## 2018-08-30 RX ADMIN — INSULIN ASPART 1 UNIT: 100 INJECTION, SOLUTION INTRAVENOUS; SUBCUTANEOUS at 18:01

## 2018-08-30 RX ADMIN — AMLODIPINE BESYLATE 1 MG: 10 TABLET ORAL at 10:00

## 2018-08-30 RX ADMIN — HEPARIN SODIUM 1 UNIT: 5000 INJECTION, SOLUTION INTRAVENOUS; SUBCUTANEOUS at 10:02

## 2018-08-30 RX ADMIN — CASTOR OIL AND BALSAM, PERU 1 APPLIC: 788; 87 OINTMENT TOPICAL at 09:00

## 2018-08-30 RX ADMIN — COLLAGENASE SANTYL 1 APPLIC: 250 OINTMENT TOPICAL at 10:06

## 2018-08-30 RX ADMIN — INSULIN ASPART 1 UNIT: 100 INJECTION, SOLUTION INTRAVENOUS; SUBCUTANEOUS at 00:07
